# Patient Record
Sex: MALE | Race: WHITE | Employment: FULL TIME
[De-identification: names, ages, dates, MRNs, and addresses within clinical notes are randomized per-mention and may not be internally consistent; named-entity substitution may affect disease eponyms.]

---

## 2017-06-26 DIAGNOSIS — Q20.0 TRUNCUS ARTERIOSUS: Primary | ICD-10-CM

## 2017-06-28 ENCOUNTER — RECORDS - HEALTHEAST (OUTPATIENT)
Dept: ADMINISTRATIVE | Facility: OTHER | Age: 22
End: 2017-06-28

## 2017-06-28 ENCOUNTER — OFFICE VISIT (OUTPATIENT)
Dept: PEDIATRIC CARDIOLOGY | Facility: CLINIC | Age: 22
End: 2017-06-28

## 2017-06-28 VITALS
SYSTOLIC BLOOD PRESSURE: 115 MMHG | HEIGHT: 71 IN | HEART RATE: 85 BPM | DIASTOLIC BLOOD PRESSURE: 70 MMHG | BODY MASS INDEX: 24.78 KG/M2 | WEIGHT: 177.03 LBS

## 2017-06-28 DIAGNOSIS — Q20.0 TRUNCUS ARTERIOSUS: Primary | ICD-10-CM

## 2017-06-28 ASSESSMENT — PAIN SCALES - GENERAL: PAINLEVEL: NO PAIN (0)

## 2017-06-28 NOTE — MR AVS SNAPSHOT
After Visit Summary   2017    Spike Silva    MRN: [id number]           Patient Information     Date Of Birth          1995        Visit Information        Provider Department      2017 3:30 PM Matthew Galvez MD [facility]        Today's Diagnoses     Truncus arteriosus    -  1      Care Instructions    [facility]  Pediatric Specialty Clinic [place]      Pediatric Call Center [phone], option 1  Maggie Montiel RN Care Coordinator:  [phone]    After Hours Emergency:  [phone].  Ask for the on-call doctor for the specialty you are calling for be paged.    Prescription Renewals:  Your pharmacy must fax requests to [phone].  Please allow 2-3 days for prescriptions to be authorized.    If your physician has ordered an x-ray or MRI, you may schedule this test by calling [facility] in [place] at [phone].            Follow-ups after your visit        Who to contact     Please call your clinic at [phone] to:    Ask questions about your health    Make or cancel appointments    Discuss your medicines    Learn about your test results    Speak to your doctor   If you have compliments or concerns about an experience at your clinic, or if you wish to file a complaint, please contact [facility] Patient Relations at [phone] or email us at [email or web address]         Additional Information About Your Visit        MyChart Information     [employer] is an electronic gateway that provides easy, online access to your medical records. With [employer], you can request a clinic appointment, read your test results, renew a prescription or communicate with your care team.     To sign up for [employer] visit the website at [email or web address]   You will be asked to enter the access code listed below, as well as some personal information. Please  "follow the directions to create your username and password.     Your access code is: [email or web address]  Expires: 2017  2:36 PM     Your access code will  in 90 days. If you need help or a new code, please contact your [facility] or call [phone] for assistance.        Care EveryWhere ID     This is your Care EveryWhere ID. This could be used by other organizations to access your [place] medical records  [id number]        Your Vitals Were     Pulse Height BMI (Body Mass Index)             85 5' 10.87\" (180 cm) 24.78 kg/m2          Blood Pressure from Last 3 Encounters:   17 115/70   16 121/76   06/24/15 119/76    Weight from Last 3 Encounters:   17 177 lb 0.5 oz (80.3 kg) (78 %)*   16 162 lb 0.6 oz (73.5 kg) (66 %)*   06/24/15 152 lb 1.9 oz (69 kg) (58 %)*     * Growth percentiles are based on CDC 2-20 Years data.              We Performed the Following     EKG 12-lead complete w/read - Same Day        Primary Care Provider Office Phone # Fax #    Attila Lombardo [phone]       [facility] [address] [address]        Equal Access to Services     DONAVAN SHAIKH : Hadii maeto Cruz, waaxda karrie, qaybta kaalisabel encarnacion . So [facility] [phone].    ATENCIÓN: Si habla español, tiene a stevens disposición servicios gratuitos de asistencia lingüística. Llame al [phone].    We comply with applicable federal civil rights laws and [place] laws. We do not discriminate on the basis of race, color, national origin, age, disability sex, sexual orientation or gender identity.            Thank you!     Thank you for choosing [facility]  for your care. Our goal is always to provide you with excellent care. Hearing back from our patients is one way we can continue to improve our services. Please take a few minutes to " complete the written survey that you may receive in the mail after your visit with us. Thank you!             Your Updated Medication List - Protect others around you: Learn how to safely use, store and throw away your medicines at www.disposemymeds.org.          This list is accurate as of: 6/28/17  3:47 PM.  Always use your most recent med list.                   Brand Name Dispense Instructions for use Diagnosis    cyproheptadine 4 MG tablet    PERIACTIN     Take 6 mg by mouth 3 times daily as needed (for migraine H/A's) 1.5 tabs twice daily        divalproex 500 MG 24 hr tablet    DEPAKOTE ER     Take 1,000 mg by mouth daily        MIDRIN PO      Take 1 tablet by mouth at onset of headache May take 2 tabs PRN

## 2017-06-28 NOTE — LETTER
"  6/28/2017      RE: Spike Silva  [address] [place] [address]       Pediatric Cardiology Visit    Patient:  Spike Silva MRN:  [id number]   YOB: 1995 Age:  21 year old   Date of Visit:  6/28/2017 PCP:  Attila Lombardo MD     Dear Dr. Lombardo:    I had the pleasure of seeing Spike Silva at the [facility] in [place] on 6/28/2017 in ongoing consultation for truncus arteriosus. As you know, he is a 21 year old male with truncus arteriosus, s/p initial PA banding, subsequent complete repair with RV-PA conduit and VSD repair, now s/p PVR with a 21mm porcine Biocor valve in 2008 with Dr. Sexton. I last saw him in 6/2016, and int eh interval since then he has been generally healthy.Active in baseball and trap shooting. No complaints of/perceived chest pain, dyspnea, palpitation, syncope/pre-syncope, easy fatigability. Easily keeps up with peers.    Past medical history:   Past Medical History:   Diagnosis Date     Truncus arteriosus 7/25/2012    As above.  I reviewed Spike Silva's medical records.    He has a current medication list which includes the following prescription(s): divalproex, cyproheptadine, and isometheptene-dichloral-apap. He is allergic to amoxicillin.    Family and Social History: [employer], no tobacco.    The Review of Systems is negative other than noted in the HPI    Physical Examination:  /70 (BP Location: Right arm, Patient Position: Chair, Cuff Size: Adult Large)  Pulse 85  Ht 1.8 m (5' 10.87\")  Wt 80.3 kg (177 lb 0.5 oz)  BMI 24.78 kg/m2  GENERAL: Pleasant and conversant, non-distressed  SKIN: Clear, no rash or abnormal pigmentation  HEAD: NC/AT, nondysmorphic  NECK: Supple without lymphadenopathy or thyromegaly  LUNGS: CTAB, normal symmetric air entry, normal WOB, no rales/rhonchi/wheezes  HEART: Quiet precordium, RRR, normal S1/S2, 4/6 JEFFREY along LUSB with palpable " thrill (louder c/w prior exam), nonradiating, quiet in diastole, S3 present, no r/g  ABDOMEN: Soft, NT/ND, normoactive BS, no HSM  EXTREMITIES: W/WP, no c/c/e, pulses 2+ throughout without radio-femoral delay    I reviewed and interpreted Spike's ECG from today, which showed normal sinus rhythm, normal axes and RBBB, no preexcitation, normal ST-T waves, and normal voltages.   I reviewed his echo from today, which showed normal RV size and systolic function; moderate/severe conduit stenosis to peak 59mmHg, minimal conduit insufficiency. No truncal stenosis or insufficiency. Normal biventricular systolic function, no effusion. Trivial TR, estimated RVSP 86mmHg suggesting additional obstruction apart from the conduit stenosis; branch PAs not well seen.    Assessment and Plan: Spike is a 21 year old male with truncus arteriosus s/p repair with RV-PA conduit and subsequent conduit replacement with a 21mm biocor valve. He is asymptomatic. I am concerned today about the change in his exam and worsened conduit stenosis and elevated RVSP. His MRI from 2015 was concerning for progressive conduit stenosis, but he remains without symptoms and the changes on echocardiogram this year are pronounced. I discussed findings today with Spike, mom and girlfriend. I will arrange for a cardiac CT to evaluate his distal conduit and branch PAs, as well as coronary anatomy for the possibility of transcatheter intervention including possible conduit stent and pulmonary valve implantation. In addition, I will have him see my colleague DR. Matt after the CT to discuss these options. He will follow-up after a presumptive cardiac catheterization with an echocardiogram. He has no activity restrictions. No antibiotic prophylaxis required for invasive procedures.    Thank you for the opportunity to follow Spike with you. Please don't hesitate to contact me with questions or concerns.    Matthew Galvez MD  Pediatric Cardiology  [facility]  [facility] [place] [address], 5th floor, [facility] [address]  Phone [phone]  Fax [phone]

## 2017-06-28 NOTE — NURSING NOTE
"Chief Complaint   Patient presents with     Heart Problem     Follow-up on Truncus.       Initial /70 (BP Location: Right arm, Patient Position: Chair, Cuff Size: Adult Large)  Pulse 85  Ht 5' 10.87\" (180 cm)  Wt 177 lb 0.5 oz (80.3 kg)  BMI 24.78 kg/m2 Estimated body mass index is 24.78 kg/(m^2) as calculated from the following:    Height as of this encounter: 5' 10.87\" (180 cm).    Weight as of this encounter: 177 lb 0.5 oz (80.3 kg).  Medication Reconciliation: complete  "

## 2017-06-28 NOTE — PATIENT INSTRUCTIONS
[facility]  Pediatric Specialty Clinic [place]      Pediatric Call Center [phone], option 1  Maggie Montiel RN Care Coordinator:  [phone]    After Hours Emergency:  [phone].  Ask for the on-call doctor for the specialty you are calling for be paged.    Prescription Renewals:  Your pharmacy must fax requests to [phone].  Please allow 2-3 days for prescriptions to be authorized.    If your physician has ordered an x-ray or MRI, you may schedule this test by calling [facility] in [place] at [phone].

## 2017-06-30 LAB — INTERPRETATION ECG - MUSE: NORMAL

## 2017-07-18 ENCOUNTER — HOSPITAL ENCOUNTER (OUTPATIENT)
Dept: CT IMAGING | Facility: CLINIC | Age: 22
Discharge: HOME | End: 2017-07-18
Attending: PEDIATRICS | Admitting: PEDIATRICS
Payer: COMMERCIAL

## 2017-07-18 VITALS — HEART RATE: 82 BPM | DIASTOLIC BLOOD PRESSURE: 77 MMHG | SYSTOLIC BLOOD PRESSURE: 126 MMHG

## 2017-07-18 DIAGNOSIS — Q20.0 TRUNCUS ARTERIOSUS: ICD-10-CM

## 2017-07-18 PROCEDURE — 25000132 ZZH RX MED GY IP 250 OP 250 PS 637: Performed by: INTERNAL MEDICINE

## 2017-07-18 PROCEDURE — 25000128 H RX IP 250 OP 636: Performed by: INTERNAL MEDICINE

## 2017-07-18 PROCEDURE — 75573 CT HRT C+ STRUX CGEN HRT DS: CPT | Mod: 26 | Performed by: INTERNAL MEDICINE

## 2017-07-18 PROCEDURE — 75573 CT HRT C+ STRUX CGEN HRT DS: CPT

## 2017-07-18 RX ORDER — IOPAMIDOL 755 MG/ML
140 INJECTION, SOLUTION INTRAVASCULAR ONCE
Status: COMPLETED | OUTPATIENT
Start: 2017-07-18 | End: 2017-07-18

## 2017-07-18 RX ORDER — METOPROLOL TARTRATE 25 MG/1
25 TABLET, FILM COATED ORAL ONCE
Status: COMPLETED | OUTPATIENT
Start: 2017-07-18 | End: 2017-07-18

## 2017-07-18 RX ADMIN — IOPAMIDOL 140 ML: 755 INJECTION, SOLUTION INTRAVENOUS at 13:40

## 2017-07-18 RX ADMIN — METOPROLOL TARTRATE 25 MG: 25 TABLET ORAL at 13:01

## 2017-07-19 ENCOUNTER — RECORDS - HEALTHEAST (OUTPATIENT)
Dept: ADMINISTRATIVE | Facility: OTHER | Age: 22
End: 2017-07-19

## 2017-07-19 ENCOUNTER — OFFICE VISIT (OUTPATIENT)
Dept: PEDIATRIC CARDIOLOGY | Facility: CLINIC | Age: 22
End: 2017-07-19
Attending: PEDIATRICS
Payer: COMMERCIAL

## 2017-07-19 VITALS
RESPIRATION RATE: 16 BRPM | HEART RATE: 86 BPM | WEIGHT: 178.57 LBS | DIASTOLIC BLOOD PRESSURE: 64 MMHG | OXYGEN SATURATION: 100 % | SYSTOLIC BLOOD PRESSURE: 118 MMHG | BODY MASS INDEX: 25 KG/M2 | HEIGHT: 71 IN

## 2017-07-19 DIAGNOSIS — Q20.0 TRUNCUS ARTERIOSUS: Primary | ICD-10-CM

## 2017-07-19 DIAGNOSIS — T82.598A RIGHT VENTRICLE-TO-PULMONARY ARTERY (RV-PA) CONDUIT OBSTRUCTION (H): ICD-10-CM

## 2017-07-19 DIAGNOSIS — Z87.74 S/P SURGERY FOR COMPLEX CONGENITAL HEART DISEASE: ICD-10-CM

## 2017-07-19 PROCEDURE — 99213 OFFICE O/P EST LOW 20 MIN: CPT | Mod: ZF

## 2017-07-19 ASSESSMENT — PAIN SCALES - GENERAL: PAINLEVEL: NO PAIN (0)

## 2017-07-19 NOTE — PROGRESS NOTES
"Pediatric Cardiology Visit    Patient:  Spike Silva MRN:  [id number]   YOB: 1995 Age:  21 year old   Date of Visit:  6/28/2017 PCP:  Attila Lombardo MD     Dear Dr. Lombardo:    I had the pleasure of seeing Spike Silva at the [facility] in [place] on 6/28/2017 in ongoing consultation for truncus arteriosus. As you know, he is a 21 year old male with truncus arteriosus, s/p initial PA banding, subsequent complete repair with RV-PA conduit and VSD repair, now s/p PVR with a 21mm porcine Biocor valve in 2008 with Dr. Sexton. I last saw him in 6/2016, and int eh interval since then he has been generally healthy.Active in baseball and trap shooting. No complaints of/perceived chest pain, dyspnea, palpitation, syncope/pre-syncope, easy fatigability. Easily keeps up with peers.    Past medical history:   Past Medical History:   Diagnosis Date     Truncus arteriosus 7/25/2012    As above.  I reviewed Spike Silva's medical records.    He has a current medication list which includes the following prescription(s): divalproex, cyproheptadine, and isometheptene-dichloral-apap. He is allergic to amoxicillin.    Family and Social History: [employer], no tobacco.    The Review of Systems is negative other than noted in the HPI    Physical Examination:  /70 (BP Location: Right arm, Patient Position: Chair, Cuff Size: Adult Large)  Pulse 85  Ht 1.8 m (5' 10.87\")  Wt 80.3 kg (177 lb 0.5 oz)  BMI 24.78 kg/m2  GENERAL: Pleasant and conversant, non-distressed  SKIN: Clear, no rash or abnormal pigmentation  HEAD: NC/AT, nondysmorphic  NECK: Supple without lymphadenopathy or thyromegaly  LUNGS: CTAB, normal symmetric air entry, normal WOB, no rales/rhonchi/wheezes  HEART: Quiet precordium, RRR, normal S1/S2, 4/6 JEFFREY along LUSB with palpable thrill (louder c/w prior exam), nonradiating, quiet in diastole, S3 present, no r/g  ABDOMEN: " Soft, NT/ND, normoactive BS, no HSM  EXTREMITIES: W/WP, no c/c/e, pulses 2+ throughout without radio-femoral delay    I reviewed and interpreted Spike's ECG from today, which showed normal sinus rhythm, normal axes and RBBB, no preexcitation, normal ST-T waves, and normal voltages.   I reviewed his echo from today, which showed normal RV size and systolic function; moderate/severe conduit stenosis to peak 59mmHg, minimal conduit insufficiency. No truncal stenosis or insufficiency. Normal biventricular systolic function, no effusion. Trivial TR, estimated RVSP 86mmHg suggesting additional obstruction apart from the conduit stenosis; branch PAs not well seen.    Assessment and Plan: Spike is a 21 year old male with truncus arteriosus s/p repair with RV-PA conduit and subsequent conduit replacement with a 21mm biocor valve. He is asymptomatic. I am concerned today about the change in his exam and worsened conduit stenosis and elevated RVSP. His MRI from 2015 was concerning for progressive conduit stenosis, but he remains without symptoms and the changes on echocardiogram this year are pronounced. I discussed findings today with Spike, mom and girlfriend. I will arrange for a cardiac CT to evaluate his distal conduit and branch PAs, as well as coronary anatomy for the possibility of transcatheter intervention including possible conduit stent and pulmonary valve implantation. In addition, I will have him see my colleague DR. Matt after the CT to discuss these options. He will follow-up after a presumptive cardiac catheterization with an echocardiogram. He has no activity restrictions. No antibiotic prophylaxis required for invasive procedures.    Thank you for the opportunity to follow Spike with you. Please don't hesitate to contact me with questions or concerns.    Matthew Galvez MD  Pediatric Cardiology  [facility]'[address], 5th floor,  [facility] [address]  Phone [phone]  Fax [phone]

## 2017-07-19 NOTE — NURSING NOTE
"Chief Complaint   Patient presents with     Heart Problem     Truncus arterious consult.       Initial /72  Pulse 84  Resp 16  Ht 5' 11.26\" (181 cm)  Wt 178 lb 9.2 oz (81 kg)  SpO2 100%  BMI 24.72 kg/m2 Estimated body mass index is 24.72 kg/(m^2) as calculated from the following:    Height as of this encounter: 5' 11.26\" (181 cm).    Weight as of this encounter: 178 lb 9.2 oz (81 kg).  Medication Reconciliation: complete       Ligia Lala M.A.    "

## 2017-07-19 NOTE — PROGRESS NOTES
Pediatric Cardiology Clinic Note    Patient:  Spike Silva MRN:  [id number]   YOB: 1995 Age:  21 year old   Date of Visit:  2017 PCP:  Attila Lombardo MD     Dear Attila Crawley MD:    I had the pleasure of seeing your patient Spike Silva at the [facility] for a consultation on 2017 for a consultation regarding cardiac catheterization and interventions on his stenotic conduit.     History of Present Illness:     Spike Silva is a 21 year old with     1. Truncus arteriosus  2. Status post initial pulmonary artery banding in the  period  3. Status post total repair with homograft and VSD patch closure in infancy by Dr. Deras to [facility]  4. Status post RV to PA conduit revision in  using the 21 mm Biocor [place] [place] bioprosthetic valve along with that Dacron patch augmentation of the homograft at [facility]  5. Recurrent conduit stenosis, severe  6. Severe RV hypertension.    Spike is followed by my colleague Dr. Galvez. He has been referred to me for consultation for potential transcatheter intervention on his stenotic conduit.He recently graduated from high school, and is planning to attend [employer] in the fall to pursue a degree in criminal justice and eventually do law enforcement work. He remains active in recreational sports including baseball, and trap shooting. No complaints of/perceived chest pain, dyspnea, palpitation, syncope/pre-syncope, easy fatigability. He does mention that he tires relatively easily compared to his peers.     Past Medical History:     PMH/Birth Hx:  The past medical history was reviewed with the patient and family today and updated    Past surgical Hx: As above    No recent ER visits or hospitalizations. No history of asthma.   Immunizations UTD per parents.  "  He has a current medication list which includes the following prescription(s): divalproex, cyproheptadine, and isometheptene-dichloral-apap. Heis allergic to amoxicillin.      Family and Social History:     The family history was reviewed and updated today. No significant changes were noted.   Mom/Parents report that there is no family history of congenital heart disease, early/unexplained sudden deaths, persons needing pacemakers/defibrillators at a young age.    Mom/Parents report that there is no family history of WPW syndrome, Brugada syndrome, or long QT syndrome.         Review of Systems: A comprehensive review of systems was performed and is negative, except as noted in the HPI and PMH    Physical exam:  His height is 5' 11.26\" (181 cm) and weight is 178 lb 9.2 oz (81 kg). His blood pressure is 114/72 and his pulse is 84. His respiration is 16 and oxygen saturation is 100%.   His body mass index is 24.72 kg/(m^2).  His body surface area is 2.02 meters squared.  There is no central or peripheral cyanosis. Pupils are reactive and sclera are not jaundiced. There is no conjunctival injection or discharge. EOMI. Mucous membranes are moist and pink.   Lungs are clear to ausculation bilaterally with no wheezes, rales or rhonchi. There is no increased work of breathing, retractions or nasal flaring. Precordium is quiet with a normally placed apical impulse. On auscultation, heart sounds are regular with normal S1 and physiologically split S2. There is a thrill on palpation. There is a grade 4/6 high frequency ejection systolic murmur in the left upper sternal border radiating to the axillary. There is no diastolic component.  Abdomen is soft and non-tender without masses or hepatomegaly. Femoral pulses are normal with no brachial femoral delay.Skin is without rashes, lesions, or significant bruising. Extremities are warm and well-perfused with no cyanosis, clubbing or edema. Peripheral pulses are normal and there " is < 2 sec capillary refill. Patient is alert and oriented and moves all extremities equally with normal tone.       Repeat Blood Pressure   BP Pulse Site Cuff Size Time Date   118/64 86 Right Leg Thigh 11:13 AM 2017     No orthostatic vitals data filed.  No peak flow data filed.  No pain information filed.  72 %ile based on CDC 2-20 Years stature-for-age data using vitals from 2017.  79 %ile based on CDC 2-20 Years weight-for-age data using vitals from 2017.  70 %ile based on CDC 2-20 Years BMI-for-age data using vitals from 2017.  No head circumference on file for this encounter.  Blood pressure percentiles are 18 % systolic and 25 % diastolic based on NHBPEP's 4th Report. Blood pressure percentile targets: 90: 137/95, 95: 141/99, 99 + 5 mmH/112.           Investigations and lab work:     12 Lead EKG performed recently was reviewed, it  shows normal sinus rhythm with right bundle branch block.  An echocardiogram performed recently was reviewed by me. It showed RV hypertension with estimated RV pressures of 87+ a WAVE, severe stenosis of the conduit with an immobile pulmonary valves. Post Op Truncus Arteriosis. Technically difficult study due to poor acoustic  windows. Normal right ventricular size. Low normal right ventricular systolic  function. Normal left ventricular systolic function. The peak gradient in the  pulmonary artery homograft is 59 mmHg. Estimated right ventricular systolic  pressure is 86.0 mmHg plus right atrial pressure.    A cardiac MRI performed in  was reviewed today by me along with the parents and the patient. It showed  IMPRESSION:   1. Truncus arteriosus status post surgical repair.  2. Narrowing of the RV to pulmonary artery conduit (14mm) with respect  to the left pulmonary artery (21mm).  3. Truncal root measures 5.2 cm at the sinuses of Valsalva.  4. Right atrial and ventricular enlargement.  5. Borderline left ventricular enlargement.    A CT angiogram of  the heart which was performed yesterday was reviewed by me and I showed it to the parents and the patient. It showed    1. Truncus arteriosus status post repair.   2. There is a PA conduit that is severely calcified. It measures 18 mm  x 18 mm at its origin/RV insertion, 19 mm x 16 mm in its mid segment,  17 mm x 8 mm at its narrowest segment just prior to the PA  bifurcation.  3. Single coronary artery with a posterior takeoff, that splits into  the LAD and a dominant circumflex artery that gives off the PDA. Given  the posterior takeoff, the left main or LAD are not in close proximity  to the PA conduit.  4. Moderate to severe enlargement of the aortic/truncus root,  measuring 5 cm x 4.9 cm at the sinuses of Valsalva.  5. Please see radiology report for incidental noncardiac findings.         Assessment and Plan:     In summary, Spike is a 21 year old with     1. Truncus arteriosus  2. Status post initial pulmonary artery banding in the  period  3. Status post total repair with homograft and VSD patch closure in infancy by Dr. Deras to [facility]  4. Status post RV to PA conduit revision in  using the 21 mm Biocor [place] [place] bioprosthetic valve along with that Dacron patch augmentation of the homograft at [facility]  5. Recurrent conduit stenosis, severe  6. Severe RV hypertension secondary to a combination of immobile, stenotic pulmonary valve, calcified conduit with distal conduit stenosis.      I spent a lot of time talking to the patient and his parents today. We reviewed all his imaging in the past. I think Spike Silva will benefit from cardiac catheterisation and transcatheter intervention including balloon dilation and stent placement in the distal stenotic conduit followed by transcatheter pulmonary valve implantation inside his existing bioprosthetic valve. I discussed with the parents in detail about the options including cardiac catheterisation and surgery. I  explained to them the details of cardiac catheterization including the risks and benefits of the procedure.  Parents and Spike Silva voiced understanding the risks of the procedure and would like to go ahead with cardiac catheterisation which will be scheduled in the next 4-8 weeks.  In view of risks of conduit injury, arrhythmias and the need for large sheath in the femoral vein, we would like to observe him overnight in the cardiac intensive care unit.   I asked to see him back in 4-8 weeks for the cardiac catheterisation procedure.       Thank you for the opportunity to participate in the care of Spike Silva . Please do not hesitate to call with questions or concerns.    Sincerely,      Hunter Matt MD, [facility]   of [facility].  Pediatric interventional cardiologist.   [facility], [facility].   Email: [email or web address]      I, Hunter Matt, spent a total of 60 minutes face-to-face with the patient, Spike Silva. Over 50% of my time was spent counseling the patient and/or coordinating care regarding the diagnosis and its management.       CC:    1. Attila Lombardo    2.  CC  Patient Care Team:  Attila Lombardo MD as PCP - General (Pediatrics)  Darren Diamond MD as MD (Pediatric Cardiology)  DARREN DIAMOND

## 2017-07-19 NOTE — PROVIDER NOTIFICATION
"   07/19/17 1239   Child Life   Location [facility]  (Cardiology clinic)   Intervention Initial Assessment;Preparation;Family Support   Preparation Comment Provided photo and verbal preparation for patient's heart cath. Patient states that he is a little nervous and this is a lot of information. CFLS encouraged pt to write down any questions and to share questions and concerns with medical team on the day of his procedure. Patient stated that he odette well with injections, but has a harder time with IV's. Pt is familiar with Jtip and does feel it is helpful. Also encouraged pt to bring favorites from home for recovery time following cath.    Family Support Comment Mom and Dad both present and supportive. Both parents participated in preparation and asked appropriate questions. Dad reports that pt is very \"Crabby\" when he wakes up from sedation. Dad tends to use humor to try to lighten the mood.   16 Years and above effective coping strategies;effective social interaction skills;enhanced independence;practices good health habits;secure in body image/gender role;views problems comprehensively   Growth and Development Comment Appropriate   Anxiety Appropriate   Able to Shift Focus From Anxiety Easy   Special Interests Country music   Outcomes/Follow Up Continue to Follow/Support     "

## 2017-07-19 NOTE — LETTER
2017      RE: Spike Silva  [address] [place] [address]                                                          Pediatric Cardiology Clinic Note    Patient:  Spike Silva MRN:  [id number]   YOB: 1995 Age:  21 year old   Date of Visit:  2017 PCP:  Attila Lombardo MD     Dear Attila Crawley MD:    I had the pleasure of seeing your patient Spike Silva at the [facility] for a consultation on 2017 for a consultation regarding cardiac catheterization and interventions on his stenotic conduit.     History of Present Illness:     Spike Silva is a 21 year old with     1. Truncus arteriosus  2. Status post initial pulmonary artery banding in the  period  3. Status post total repair with homograft and VSD patch closure in infancy by Dr. Deras to [facility]  4. Status post RV to PA conduit revision in  using the 21 mm Biocor [place]. Raymond bioprosthetic valve along with that Dacron patch augmentation of the homograft at [facility]  5. Recurrent conduit stenosis, severe  6. Severe RV hypertension.    Spike is followed by my colleague Dr. Galvez. He has been referred to me for consultation for potential transcatheter intervention on his stenotic conduit.He recently graduated from high school, and is planning to attend [employer] in the fall to pursue a degree in criminal justice and eventually do law enforcement work. He remains active in recreational sports including baseball, and trap shooting. No complaints of/perceived chest pain, dyspnea, palpitation, syncope/pre-syncope, easy fatigability. He does mention that he tires relatively easily compared to his peers.     Past Medical History:     PMH/Birth Hx:  The past medical history was reviewed with the patient and family today and updated    Past surgical Hx: As above    No  "recent ER visits or hospitalizations. No history of asthma.   Immunizations UTD per parents.   He has a current medication list which includes the following prescription(s): divalproex, cyproheptadine, and isometheptene-dichloral-apap. Heis allergic to amoxicillin.      Family and Social History:     The family history was reviewed and updated today. No significant changes were noted.   Mom/Parents report that there is no family history of congenital heart disease, early/unexplained sudden deaths, persons needing pacemakers/defibrillators at a young age.    Mom/Parents report that there is no family history of WPW syndrome, Brugada syndrome, or long QT syndrome.         Review of Systems: A comprehensive review of systems was performed and is negative, except as noted in the HPI and PMH    Physical exam:  His height is 5' 11.26\" (181 cm) and weight is 178 lb 9.2 oz (81 kg). His blood pressure is 114/72 and his pulse is 84. His respiration is 16 and oxygen saturation is 100%.   His body mass index is 24.72 kg/(m^2).  His body surface area is 2.02 meters squared.  There is no central or peripheral cyanosis. Pupils are reactive and sclera are not jaundiced. There is no conjunctival injection or discharge. EOMI. Mucous membranes are moist and pink.   Lungs are clear to ausculation bilaterally with no wheezes, rales or rhonchi. There is no increased work of breathing, retractions or nasal flaring. Precordium is quiet with a normally placed apical impulse. On auscultation, heart sounds are regular with normal S1 and physiologically split S2. There is a thrill on palpation. There is a grade 4/6 high frequency ejection systolic murmur in the left upper sternal border radiating to the axillary. There is no diastolic component.  Abdomen is soft and non-tender without masses or hepatomegaly. Femoral pulses are normal with no brachial femoral delay.Skin is without rashes, lesions, or significant bruising. Extremities are warm " and well-perfused with no cyanosis, clubbing or edema. Peripheral pulses are normal and there is < 2 sec capillary refill. Patient is alert and oriented and moves all extremities equally with normal tone.       Repeat Blood Pressure   BP Pulse Site Cuff Size Time Date   118/64 86 Right Leg Thigh 11:13 AM 2017     No orthostatic vitals data filed.  No peak flow data filed.  No pain information filed.  72 %ile based on CDC 2-20 Years stature-for-age data using vitals from 2017.  79 %ile based on CDC 2-20 Years weight-for-age data using vitals from 2017.  70 %ile based on CDC 2-20 Years BMI-for-age data using vitals from 2017.  No head circumference on file for this encounter.  Blood pressure percentiles are 18 % systolic and 25 % diastolic based on NHBPEP's 4th Report. Blood pressure percentile targets: 90: 137/95, 95: 141/99, 99 + 5 mmH/112.           Investigations and lab work:     12 Lead EKG performed recently was reviewed, it  shows normal sinus rhythm with right bundle branch block.  An echocardiogram performed recently was reviewed by me. It showed RV hypertension with estimated RV pressures of 87+ a WAVE, severe stenosis of the conduit with an immobile pulmonary valves. Post Op Truncus Arteriosis. Technically difficult study due to poor acoustic  windows. Normal right ventricular size. Low normal right ventricular systolic  function. Normal left ventricular systolic function. The peak gradient in the  pulmonary artery homograft is 59 mmHg. Estimated right ventricular systolic  pressure is 86.0 mmHg plus right atrial pressure.    A cardiac MRI performed in  was reviewed today by me along with the parents and the patient. It showed  IMPRESSION:   1. Truncus arteriosus status post surgical repair.  2. Narrowing of the RV to pulmonary artery conduit (14mm) with respect  to the left pulmonary artery (21mm).  3. Truncal root measures 5.2 cm at the sinuses of Valsalva.  4. Right atrial  and ventricular enlargement.  5. Borderline left ventricular enlargement.    A CT angiogram of the heart which was performed yesterday was reviewed by me and I showed it to the parents and the patient. It showed    1. Truncus arteriosus status post repair.   2. There is a PA conduit that is severely calcified. It measures 18 mm  x 18 mm at its origin/RV insertion, 19 mm x 16 mm in its mid segment,  17 mm x 8 mm at its narrowest segment just prior to the PA  bifurcation.  3. Single coronary artery with a posterior takeoff, that splits into  the LAD and a dominant circumflex artery that gives off the PDA. Given  the posterior takeoff, the left main or LAD are not in close proximity  to the PA conduit.  4. Moderate to severe enlargement of the aortic/truncus root,  measuring 5 cm x 4.9 cm at the sinuses of Valsalva.  5. Please see radiology report for incidental noncardiac findings.         Assessment and Plan:     In summary, Spike is a 21 year old with     1. Truncus arteriosus  2. Status post initial pulmonary artery banding in the  period  3. Status post total repair with homograft and VSD patch closure in infancy by Dr. Deras to [facility]  4. Status post RV to PA conduit revision in  using the 21 mm Biocor St. Raymond bioprosthetic valve along with that Dacron patch augmentation of the homograft at [facility]  5. Recurrent conduit stenosis, severe  6. Severe RV hypertension secondary to a combination of immobile, stenotic pulmonary valve, calcified conduit with distal conduit stenosis.      I spent a lot of time talking to the patient and his parents today. We reviewed all his imaging in the past. I think Spike Silva will benefit from cardiac catheterisation and transcatheter intervention including balloon dilation and stent placement in the distal stenotic conduit followed by transcatheter pulmonary valve implantation inside his existing bioprosthetic valve. I  discussed with the parents in detail about the options including cardiac catheterisation and surgery. I explained to them the details of cardiac catheterization including the risks and benefits of the procedure.  Parents and Spike Silva voiced understanding the risks of the procedure and would like to go ahead with cardiac catheterisation which will be scheduled in the next 4-8 weeks.  In view of risks of conduit injury, arrhythmias and the need for large sheath in the femoral vein, we would like to observe him overnight in the cardiac intensive care unit.   I asked to see him back in 4-8 weeks for the cardiac catheterisation procedure.       Thank you for the opportunity to participate in the care of Spike Silva . Please do not hesitate to call with questions or concerns.    Sincerely,      Hunter Matt MD, [facility]   of Pediatrics.  Pediatric interventional cardiologist.   [facility].   Email: [email or web address]      I, Hunter Matt, spent a total of 60 minutes face-to-face with the patient, Spike Silva. Over 50% of my time was spent counseling the patient and/or coordinating care regarding the diagnosis and its management.       CC  Patient Care Team:  Attila Lombardo MD as PCP - General (Pediatrics)  Matthew Galvez MD as MD (Pediatric Cardiology)

## 2017-07-19 NOTE — MR AVS SNAPSHOT
After Visit Summary   7/19/2017    Spike Silva    MRN: [id number]           Patient Information     Date Of Birth          1995        Visit Information        Provider Department      7/19/2017 11:00 AM Hunter Steward MD Peds Cardiology        Care Instructions      PEDS CARDIOLOGY  [facility]  [address] [id number] [phone]      Cardiology Clinic  [phone]  Cardiology Office  [phone]  RN Care Coordinator, Carolina Leija (Bre)  [phone]  Pediatric Call Center/Scheduling  [phone]    After Hours and Emergency Contact Number  [phone]  * Ask for the pediatric cardiologist on call         Prescription Renewals  The pharmacy must fax requests to [phone]  * Please allow 3-4 days for prescriptions to be authorized               Follow-ups after your visit        Future tests that were ordered for you today     Open Future Orders        Priority Expected Expires Ordered    Radiologist Consult For Cardiology Routine 7/18/2017 7/18/2018 7/18/2017    CT Congenital Heart Disease Angio Routine  7/11/2018 7/10/2017            Who to contact     Please call your clinic at [phone] to:    Ask questions about your health    Make or cancel appointments    Discuss your medicines    Learn about your test results    Speak to your doctor   If you have compliments or concerns about an experience at your clinic, or if you wish to file a complaint, please contact [facility] Patient Relations at [phone] or email us at [email or web address]         Additional Information About Your Visit        MyChart Information     [employer] is an electronic gateway that provides easy, online access to your medical records. With [employer], you can request a clinic appointment, read your test results, renew a prescription or communicate with your care team.     To sign up  "for MyChart visit the website at [email or web address]   You will be asked to enter the access code listed below, as well as some personal information. Please follow the directions to create your username and password.     Your access code is: [email or web address]  Expires: 2017  2:36 PM     Your access code will  in 90 days. If you need help or a new code, please contact your [facility] or call [phone] for assistance.        Care EveryWhere ID     This is your Care EveryWhere ID. This could be used by other organizations to access your [place] medical records  [id number]        Your Vitals Were     Pulse Respirations Height Pulse Oximetry BMI (Body Mass Index)       84 16 1.81 m (5' 11.26\") 100% 24.72 kg/m2        Blood Pressure from Last 3 Encounters:   17 114/72   17 126/77   17 115/70    Weight from Last 3 Encounters:   17 81 kg (178 lb 9.2 oz) (79 %)*   17 80.3 kg (177 lb 0.5 oz) (78 %)*   16 73.5 kg (162 lb 0.6 oz) (66 %)*     * Growth percentiles are based on [facility] 2-20 Years data.              Today, you had the following     No orders found for display       Primary Care Provider Office Phone # Fax #    Attila Lombardo [phone]       [facility] [address] [address]        Equal Access to Services     DONAVAN SHAIKH AH: Hadii aad ku hadasho Soomaali, waaxda luqadaha, qaybta kaalmada adeegyessenia, isabel louie . So [facility] [phone].    ATENCIÓN: Si habla español, tiene a stevens disposición servicios gratuitos de asistencia lingüística. Llame al [phone].    We comply with applicable federal civil rights laws and [place] laws. We do not discriminate on the basis of race, color, national origin, age, disability sex, sexual orientation or gender identity.            Thank you!     Thank you for choosing PEDS CARDIOLOGY  for your care. Our goal is always to " provide you with excellent care. Hearing back from our patients is one way we can continue to improve our services. Please take a few minutes to complete the written survey that you may receive in the mail after your visit with us. Thank you!             Your Updated Medication List - Protect others around you: Learn how to safely use, store and throw away your medicines at www.disposemymeds.org.          This list is accurate as of: 7/19/17 12:12 PM.  Always use your most recent med list.                   Brand Name Dispense Instructions for use Diagnosis    cyproheptadine 4 MG tablet    PERIACTIN     Take 6 mg by mouth 3 times daily as needed (for migraine H/A's) 1.5 tabs twice daily        divalproex 500 MG 24 hr tablet    DEPAKOTE ER     Take 1,000 mg by mouth daily        MIDRIN PO      Take 1 tablet by mouth at onset of headache May take 2 tabs PRN

## 2017-07-19 NOTE — PATIENT INSTRUCTIONS
PEDS CARDIOLOGY  [facility] [address] [place] [address]  [address] [facility] [id number] [phone]      Cardiology Clinic  [phone]  Cardiology Office  [phone]  RN Care Coordinator, Carolina Leija (Bre)  [phone]  Pediatric Call Center/Scheduling  [phone]    After Hours and Emergency Contact Number  [phone]  * Ask for the pediatric cardiologist on call         Prescription Renewals  The pharmacy must fax requests to [phone]  * Please allow 3-4 days for prescriptions to be authorized

## 2017-07-24 ENCOUNTER — TELEPHONE (OUTPATIENT)
Dept: OTHER | Facility: CLINIC | Age: 22
End: 2017-07-24

## 2017-09-01 ENCOUNTER — OFFICE VISIT - HEALTHEAST (OUTPATIENT)
Dept: PEDIATRICS | Facility: CLINIC | Age: 22
End: 2017-09-01

## 2017-09-01 DIAGNOSIS — Z01.818 VISIT FOR PRE-OPERATIVE EXAMINATION: ICD-10-CM

## 2017-09-01 DIAGNOSIS — Z98.890 S/P REPAIR OF TRUNCUS ARTERIOSUS: ICD-10-CM

## 2017-09-01 ASSESSMENT — MIFFLIN-ST. JEOR: SCORE: 1822.38

## 2017-09-07 ENCOUNTER — ANESTHESIA EVENT (OUTPATIENT)
Dept: SURGERY | Facility: CLINIC | Age: 22
End: 2017-09-07
Payer: COMMERCIAL

## 2017-09-12 ENCOUNTER — TELEPHONE (OUTPATIENT)
Dept: PEDIATRIC CARDIOLOGY | Facility: CLINIC | Age: 22
End: 2017-09-12

## 2017-09-12 NOTE — TELEPHONE ENCOUNTER
Contacted patient to discuss heart catheterization scheduled on Friday 9/15/17 at 0800. Left message with family member to discuss items below. Patient called back from cell phone as he is up at [facility] ( I added cell number to chart).        The patient has not been ill; he denies, fever, runny nose, cough, vomiting, diarrhea, or rash.    Discussed:  Arrival time: 6:30 AM  NPO times: 4:30 AM, no solid foods after midnight, clear liquids up until 04:30  History & Physical completed and scanned in.   Medications: No medications need to be held before the procedure. Patient takes migraine medication and instructed him to take it with a small sip of water morning of.     Also discussed that no special soap is needed prior to the procedure and that DEL CASTILLO will be calling the family as well.  All questions were answered. Encouraged him to call us back with any questions or concerns prior to the procedure.

## 2017-09-13 RX ORDER — ALBUTEROL SULFATE 0.83 MG/ML
1 SOLUTION RESPIRATORY (INHALATION) EVERY 6 HOURS PRN
COMMUNITY

## 2017-09-15 ENCOUNTER — RECORDS - HEALTHEAST (OUTPATIENT)
Dept: ADMINISTRATIVE | Facility: OTHER | Age: 22
End: 2017-09-15

## 2017-09-15 ENCOUNTER — SURGERY (OUTPATIENT)
Age: 22
End: 2017-09-15

## 2017-09-15 ENCOUNTER — APPOINTMENT (OUTPATIENT)
Dept: CARDIOLOGY | Facility: CLINIC | Age: 22
End: 2017-09-15
Attending: PEDIATRICS
Payer: COMMERCIAL

## 2017-09-15 ENCOUNTER — ANESTHESIA (OUTPATIENT)
Dept: SURGERY | Facility: CLINIC | Age: 22
End: 2017-09-15
Payer: COMMERCIAL

## 2017-09-15 ENCOUNTER — APPOINTMENT (OUTPATIENT)
Dept: GENERAL RADIOLOGY | Facility: CLINIC | Age: 22
End: 2017-09-15
Attending: PEDIATRICS
Payer: COMMERCIAL

## 2017-09-15 ENCOUNTER — HOSPITAL ENCOUNTER (OUTPATIENT)
Facility: CLINIC | Age: 22
Discharge: HOME | End: 2017-09-16
Attending: PEDIATRICS | Admitting: PEDIATRICS
Payer: COMMERCIAL

## 2017-09-15 DIAGNOSIS — Z95.2 HISTORY OF ARTIFICIAL HEART VALVE: Primary | ICD-10-CM

## 2017-09-15 LAB
ABO + RH BLD: NORMAL
ABO + RH BLD: NORMAL
ALBUMIN SERPL-MCNC: 3.3 G/DL (ref 3.4–5)
ALP SERPL-CCNC: 35 U/L (ref 40–150)
ALT SERPL W P-5'-P-CCNC: 29 U/L (ref 0–70)
ANION GAP SERPL CALCULATED.3IONS-SCNC: 8 MMOL/L (ref 3–14)
APTT PPP: 33 SEC (ref 22–37)
AST SERPL W P-5'-P-CCNC: 15 U/L (ref 0–45)
BASE EXCESS BLDA CALC-SCNC: 0.6 MMOL/L
BASE EXCESS BLDA CALC-SCNC: 1.5 MMOL/L
BASE EXCESS BLDA CALC-SCNC: 2 MMOL/L
BASOPHILS # BLD AUTO: 0 10E9/L (ref 0–0.2)
BASOPHILS NFR BLD AUTO: 0.3 %
BILIRUB SERPL-MCNC: 0.6 MG/DL (ref 0.2–1.3)
BLD GP AB SCN SERPL QL: NORMAL
BLD PROD TYP BPU: NORMAL
BLD UNIT ID BPU: 0
BLD UNIT ID BPU: 0
BLOOD BANK CMNT PATIENT-IMP: NORMAL
BLOOD PRODUCT CODE: NORMAL
BLOOD PRODUCT CODE: NORMAL
BPU ID: NORMAL
BPU ID: NORMAL
BUN SERPL-MCNC: 12 MG/DL (ref 7–30)
CA-I BLD-MCNC: 4.5 MG/DL (ref 4.4–5.2)
CA-I BLD-MCNC: 4.5 MG/DL (ref 4.4–5.2)
CA-I BLD-MCNC: 4.6 MG/DL (ref 4.4–5.2)
CALCIUM SERPL-MCNC: 8.1 MG/DL (ref 8.5–10.1)
CHLORIDE SERPL-SCNC: 107 MMOL/L (ref 94–109)
CO2 SERPL-SCNC: 26 MMOL/L (ref 20–32)
CREAT SERPL-MCNC: 0.7 MG/DL (ref 0.66–1.25)
DIFFERENTIAL METHOD BLD: ABNORMAL
EOSINOPHIL # BLD AUTO: 0.1 10E9/L (ref 0–0.7)
EOSINOPHIL NFR BLD AUTO: 1 %
ERYTHROCYTE [DISTWIDTH] IN BLOOD BY AUTOMATED COUNT: 12.4 % (ref 10–15)
FIBRINOGEN PPP-MCNC: 171 MG/DL (ref 200–420)
GFR SERPL CREATININE-BSD FRML MDRD: >90 ML/MIN/1.7M2
GLUCOSE BLD-MCNC: 100 MG/DL (ref 70–99)
GLUCOSE BLD-MCNC: 117 MG/DL (ref 70–99)
GLUCOSE BLD-MCNC: 96 MG/DL (ref 70–99)
GLUCOSE SERPL-MCNC: 99 MG/DL (ref 70–99)
HCO3 BLD-SCNC: 24 MMOL/L (ref 21–28)
HCO3 BLD-SCNC: 25 MMOL/L (ref 21–28)
HCO3 BLD-SCNC: 26 MMOL/L (ref 21–28)
HCT VFR BLD AUTO: 41.7 % (ref 40–53)
HCT VFR BLD AUTO: 41.8 % (ref 40–53)
HGB BLD-MCNC: 14.6 G/DL (ref 13.3–17.7)
HGB BLD-MCNC: 14.7 G/DL (ref 13.3–17.7)
HGB BLD-MCNC: 14.8 G/DL (ref 13.3–17.7)
HGB BLD-MCNC: 14.8 G/DL (ref 13.3–17.7)
HGB BLD-MCNC: 15.3 G/DL (ref 13.3–17.7)
IMM GRANULOCYTES # BLD: 0 10E9/L (ref 0–0.4)
IMM GRANULOCYTES NFR BLD: 0 %
INR PPP: 1.13 (ref 0.86–1.14)
KCT BLD-ACNC: 180 SEC (ref 105–167)
KCT BLD-ACNC: 184 SEC (ref 105–167)
KCT BLD-ACNC: 196 SEC (ref 105–167)
KCT BLD-ACNC: 200 SEC (ref 105–167)
KCT BLD-ACNC: 205 SEC (ref 105–167)
KCT BLD-ACNC: 213 SEC (ref 105–167)
KCT BLD-ACNC: 213 SEC (ref 105–167)
KCT BLD-ACNC: 221 SEC (ref 105–167)
LACTATE BLD-SCNC: 2 MMOL/L (ref 0.7–2)
LACTATE BLD-SCNC: 2 MMOL/L (ref 0.7–2)
LACTATE BLD-SCNC: 2.1 MMOL/L (ref 0.7–2)
LYMPHOCYTES # BLD AUTO: 0.9 10E9/L (ref 0.8–5.3)
LYMPHOCYTES NFR BLD AUTO: 16 %
MCH RBC QN AUTO: 30.7 PG (ref 26.5–33)
MCHC RBC AUTO-ENTMCNC: 35.5 G/DL (ref 31.5–36.5)
MCV RBC AUTO: 87 FL (ref 78–100)
MONOCYTES # BLD AUTO: 0.5 10E9/L (ref 0–1.3)
MONOCYTES NFR BLD AUTO: 7.7 %
MRSA DNA SPEC QL NAA+PROBE: NEGATIVE
NEUTROPHILS # BLD AUTO: 4.4 10E9/L (ref 1.6–8.3)
NEUTROPHILS NFR BLD AUTO: 75 %
NRBC # BLD AUTO: 0 10*3/UL
NRBC BLD AUTO-RTO: 0 /100
NUM BPU REQUESTED: 2
O2/TOTAL GAS SETTING VFR VENT: 21 %
O2/TOTAL GAS SETTING VFR VENT: 21 %
O2/TOTAL GAS SETTING VFR VENT: 26 %
OXYHGB MFR BLD: 97 % (ref 92–100)
OXYHGB MFR BLD: 98 % (ref 92–100)
OXYHGB MFR BLD: 98 % (ref 92–100)
PCO2 BLD: 33 MM HG (ref 35–45)
PCO2 BLD: 35 MM HG (ref 35–45)
PCO2 BLD: 38 MM HG (ref 35–45)
PH BLD: 7.45 PH (ref 7.35–7.45)
PH BLD: 7.45 PH (ref 7.35–7.45)
PH BLD: 7.48 PH (ref 7.35–7.45)
PLATELET # BLD AUTO: 112 10E9/L (ref 150–450)
PO2 BLD: 106 MM HG (ref 80–105)
PO2 BLD: 107 MM HG (ref 80–105)
PO2 BLD: 143 MM HG (ref 80–105)
POTASSIUM BLD-SCNC: 3.8 MMOL/L (ref 3.4–5.3)
POTASSIUM BLD-SCNC: 3.8 MMOL/L (ref 3.4–5.3)
POTASSIUM BLD-SCNC: 4.1 MMOL/L (ref 3.4–5.3)
POTASSIUM SERPL-SCNC: 3.9 MMOL/L (ref 3.4–5.3)
PROT SERPL-MCNC: 6.4 G/DL (ref 6.8–8.8)
RBC # BLD AUTO: 4.82 10E12/L (ref 4.4–5.9)
SODIUM BLD-SCNC: 138 MMOL/L (ref 133–144)
SODIUM BLD-SCNC: 140 MMOL/L (ref 133–144)
SODIUM BLD-SCNC: 140 MMOL/L (ref 133–144)
SODIUM SERPL-SCNC: 141 MMOL/L (ref 133–144)
SPECIMEN EXP DATE BLD: NORMAL
SPECIMEN SOURCE: NORMAL
TRANSFUSION STATUS PATIENT QL: NORMAL
WBC # BLD AUTO: 5.9 10E9/L (ref 4–11)

## 2017-09-15 PROCEDURE — 25000128 H RX IP 250 OP 636: Performed by: NURSE ANESTHETIST, CERTIFIED REGISTERED

## 2017-09-15 PROCEDURE — C1725 CATH, TRANSLUMIN NON-LASER: HCPCS

## 2017-09-15 PROCEDURE — C1769 GUIDE WIRE: HCPCS

## 2017-09-15 PROCEDURE — 85610 PROTHROMBIN TIME: CPT | Performed by: PHYSICIAN ASSISTANT

## 2017-09-15 PROCEDURE — 93531 ZZHC COMB RT & LT HEART CATH FOR CONGENITAL ANOMALIES: CPT

## 2017-09-15 PROCEDURE — B2111ZZ FLUOROSCOPY OF MULTIPLE CORONARY ARTERIES USING LOW OSMOLAR CONTRAST: ICD-10-PCS | Performed by: PEDIATRICS

## 2017-09-15 PROCEDURE — 25000132 ZZH RX MED GY IP 250 OP 250 PS 637: Performed by: PEDIATRICS

## 2017-09-15 PROCEDURE — 86900 BLOOD TYPING SEROLOGIC ABO: CPT | Performed by: PHYSICIAN ASSISTANT

## 2017-09-15 PROCEDURE — P9016 RBC LEUKOCYTES REDUCED: HCPCS | Performed by: PHYSICIAN ASSISTANT

## 2017-09-15 PROCEDURE — 27810330

## 2017-09-15 PROCEDURE — 27210845 ZZH DEVICE INFLATION CR5

## 2017-09-15 PROCEDURE — B31U1ZZ FLUOROSCOPY OF PULMONARY TRUNK USING LOW OSMOLAR CONTRAST: ICD-10-PCS | Performed by: PEDIATRICS

## 2017-09-15 PROCEDURE — 40000014 ZZH STATISTIC ARTERIAL MONITORING DAILY

## 2017-09-15 PROCEDURE — 85347 COAGULATION TIME ACTIVATED: CPT

## 2017-09-15 PROCEDURE — 83605 ASSAY OF LACTIC ACID: CPT

## 2017-09-15 PROCEDURE — 85384 FIBRINOGEN ACTIVITY: CPT | Performed by: PHYSICIAN ASSISTANT

## 2017-09-15 PROCEDURE — 82947 ASSAY GLUCOSE BLOOD QUANT: CPT

## 2017-09-15 PROCEDURE — 27210808 ZZH SHEATH CR7

## 2017-09-15 PROCEDURE — 94681 O2 UPTK CO2 OUTP % O2 XTRC: CPT

## 2017-09-15 PROCEDURE — 37236 OPEN/PERQ PLACE STENT 1ST: CPT | Mod: XS

## 2017-09-15 PROCEDURE — 71010 XR CHEST PORT 1 VW: CPT

## 2017-09-15 PROCEDURE — 40000940 XR CHEST PORT 1 VW

## 2017-09-15 PROCEDURE — 86923 COMPATIBILITY TEST ELECTRIC: CPT | Performed by: PHYSICIAN ASSISTANT

## 2017-09-15 PROCEDURE — 25000125 ZZHC RX 250: Performed by: NURSE ANESTHETIST, CERTIFIED REGISTERED

## 2017-09-15 PROCEDURE — 84132 ASSAY OF SERUM POTASSIUM: CPT

## 2017-09-15 PROCEDURE — 02RH38Z REPLACEMENT OF PULMONARY VALVE WITH ZOOPLASTIC TISSUE, PERCUTANEOUS APPROACH: ICD-10-PCS | Performed by: PEDIATRICS

## 2017-09-15 PROCEDURE — 86850 RBC ANTIBODY SCREEN: CPT | Performed by: PHYSICIAN ASSISTANT

## 2017-09-15 PROCEDURE — 93568 NJX CAR CTH NSLC P-ART ANGRP: CPT | Mod: XU

## 2017-09-15 PROCEDURE — 84295 ASSAY OF SERUM SODIUM: CPT

## 2017-09-15 PROCEDURE — 27210841 ZZH MANIFOLD CR5

## 2017-09-15 PROCEDURE — 85025 COMPLETE CBC W/AUTO DIFF WBC: CPT | Performed by: PHYSICIAN ASSISTANT

## 2017-09-15 PROCEDURE — 20300001 ZZH R&B PICU INTERMEDIATE UMMC

## 2017-09-15 PROCEDURE — 27810329

## 2017-09-15 PROCEDURE — 27210956 ZZH BALLOON TIP PRESSURE CR7

## 2017-09-15 PROCEDURE — C1887 CATHETER, GUIDING: HCPCS

## 2017-09-15 PROCEDURE — 25000128 H RX IP 250 OP 636: Performed by: PEDIATRICS

## 2017-09-15 PROCEDURE — C1877 STENT, NON-COAT/COV W/O DEL: HCPCS

## 2017-09-15 PROCEDURE — 85014 HEMATOCRIT: CPT | Performed by: PEDIATRICS

## 2017-09-15 PROCEDURE — 85730 THROMBOPLASTIN TIME PARTIAL: CPT | Performed by: PHYSICIAN ASSISTANT

## 2017-09-15 PROCEDURE — 40000275 ZZH STATISTIC RCP TIME EA 10 MIN

## 2017-09-15 PROCEDURE — C9399 UNCLASSIFIED DRUGS OR BIOLOG: HCPCS | Performed by: NURSE ANESTHETIST, CERTIFIED REGISTERED

## 2017-09-15 PROCEDURE — 27210741 ZZH BALLOON TIP PRESSURE CR6

## 2017-09-15 PROCEDURE — 27210769 ZZH KIT ACIST INJECTOR CR4

## 2017-09-15 PROCEDURE — 87641 MR-STAPH DNA AMP PROBE: CPT | Performed by: PEDIATRICS

## 2017-09-15 PROCEDURE — 86901 BLOOD TYPING SEROLOGIC RH(D): CPT | Performed by: PHYSICIAN ASSISTANT

## 2017-09-15 PROCEDURE — 87640 STAPH A DNA AMP PROBE: CPT | Performed by: PEDIATRICS

## 2017-09-15 PROCEDURE — 80053 COMPREHEN METABOLIC PANEL: CPT | Performed by: PHYSICIAN ASSISTANT

## 2017-09-15 PROCEDURE — 82330 ASSAY OF CALCIUM: CPT

## 2017-09-15 PROCEDURE — 40000170 ZZH STATISTIC PRE-PROCEDURE ASSESSMENT II: Performed by: PEDIATRICS

## 2017-09-15 PROCEDURE — 82810 BLOOD GASES O2 SAT ONLY: CPT

## 2017-09-15 PROCEDURE — 27211192 ZZ H SHEATH CR14

## 2017-09-15 PROCEDURE — 27210856 ZZH ACCESS HEART CATH CR2

## 2017-09-15 PROCEDURE — 25000125 ZZHC RX 250: Performed by: ANESTHESIOLOGY

## 2017-09-15 PROCEDURE — 85018 HEMOGLOBIN: CPT | Performed by: PEDIATRICS

## 2017-09-15 PROCEDURE — 27210946 ZZH KIT HC TOTES DISP CR8

## 2017-09-15 PROCEDURE — 25000565 ZZH ISOFLURANE, EA 15 MIN: Performed by: PEDIATRICS

## 2017-09-15 PROCEDURE — 86900 BLOOD TYPING SEROLOGIC ABO: CPT | Performed by: PEDIATRICS

## 2017-09-15 PROCEDURE — 37000009 ZZH ANESTHESIA TECHNICAL FEE, EACH ADDTL 15 MIN: Performed by: PEDIATRICS

## 2017-09-15 PROCEDURE — 82803 BLOOD GASES ANY COMBINATION: CPT

## 2017-09-15 PROCEDURE — 25000128 H RX IP 250 OP 636: Performed by: ANESTHESIOLOGY

## 2017-09-15 PROCEDURE — 93563 NJX CGEN CAR CTH SLCTV C ANG: CPT | Mod: XU

## 2017-09-15 PROCEDURE — 33477 IMPLANT TCAT PULM VLV PERQ: CPT

## 2017-09-15 PROCEDURE — 4A023N8 MEASUREMENT OF CARDIAC SAMPLING AND PRESSURE, BILATERAL, PERCUTANEOUS APPROACH: ICD-10-PCS | Performed by: PEDIATRICS

## 2017-09-15 PROCEDURE — 37000008 ZZH ANESTHESIA TECHNICAL FEE, 1ST 30 MIN: Performed by: PEDIATRICS

## 2017-09-15 RX ORDER — CEFAZOLIN SODIUM 1 G/3ML
INJECTION, POWDER, FOR SOLUTION INTRAMUSCULAR; INTRAVENOUS PRN
Status: DISCONTINUED | OUTPATIENT
Start: 2017-09-15 | End: 2017-09-15

## 2017-09-15 RX ORDER — ONDANSETRON 2 MG/ML
INJECTION INTRAMUSCULAR; INTRAVENOUS PRN
Status: DISCONTINUED | OUTPATIENT
Start: 2017-09-15 | End: 2017-09-15

## 2017-09-15 RX ORDER — ASPIRIN 81 MG/1
81 TABLET ORAL DAILY
Status: DISCONTINUED | OUTPATIENT
Start: 2017-09-15 | End: 2017-09-16 | Stop reason: HOSPADM

## 2017-09-15 RX ORDER — LIDOCAINE HYDROCHLORIDE 20 MG/ML
INJECTION, SOLUTION INFILTRATION; PERINEURAL PRN
Status: DISCONTINUED | OUTPATIENT
Start: 2017-09-15 | End: 2017-09-15

## 2017-09-15 RX ORDER — NITROGLYCERIN 5 MG/ML
INJECTION, SOLUTION INTRAVENOUS PRN
Status: DISCONTINUED | OUTPATIENT
Start: 2017-09-15 | End: 2017-09-15 | Stop reason: HOSPADM

## 2017-09-15 RX ORDER — SODIUM CHLORIDE, SODIUM LACTATE, POTASSIUM CHLORIDE, CALCIUM CHLORIDE 600; 310; 30; 20 MG/100ML; MG/100ML; MG/100ML; MG/100ML
INJECTION, SOLUTION INTRAVENOUS CONTINUOUS PRN
Status: DISCONTINUED | OUTPATIENT
Start: 2017-09-15 | End: 2017-09-15

## 2017-09-15 RX ORDER — FENTANYL CITRATE 50 UG/ML
INJECTION, SOLUTION INTRAMUSCULAR; INTRAVENOUS PRN
Status: DISCONTINUED | OUTPATIENT
Start: 2017-09-15 | End: 2017-09-15

## 2017-09-15 RX ORDER — DIVALPROEX SODIUM 500 MG/1
1000 TABLET, EXTENDED RELEASE ORAL DAILY
Status: DISCONTINUED | OUTPATIENT
Start: 2017-09-15 | End: 2017-09-16 | Stop reason: HOSPADM

## 2017-09-15 RX ORDER — CEFAZOLIN SODIUM 1 G/3ML
1 INJECTION, POWDER, FOR SOLUTION INTRAMUSCULAR; INTRAVENOUS EVERY 6 HOURS
Status: DISCONTINUED | OUTPATIENT
Start: 2017-09-15 | End: 2017-09-16

## 2017-09-15 RX ORDER — CEFAZOLIN SODIUM 1 G/3ML
1 INJECTION, POWDER, FOR SOLUTION INTRAMUSCULAR; INTRAVENOUS EVERY 6 HOURS
Status: DISCONTINUED | OUTPATIENT
Start: 2017-09-15 | End: 2017-09-15

## 2017-09-15 RX ORDER — HEPARIN SODIUM 1000 [USP'U]/ML
INJECTION, SOLUTION INTRAVENOUS; SUBCUTANEOUS PRN
Status: DISCONTINUED | OUTPATIENT
Start: 2017-09-15 | End: 2017-09-15

## 2017-09-15 RX ORDER — DEXAMETHASONE SODIUM PHOSPHATE 4 MG/ML
INJECTION, SOLUTION INTRA-ARTICULAR; INTRALESIONAL; INTRAMUSCULAR; INTRAVENOUS; SOFT TISSUE PRN
Status: DISCONTINUED | OUTPATIENT
Start: 2017-09-15 | End: 2017-09-15

## 2017-09-15 RX ORDER — ACETAMINOPHEN 325 MG/1
325 TABLET ORAL EVERY 4 HOURS PRN
Status: DISCONTINUED | OUTPATIENT
Start: 2017-09-15 | End: 2017-09-16 | Stop reason: HOSPADM

## 2017-09-15 RX ORDER — PROPOFOL 10 MG/ML
INJECTION, EMULSION INTRAVENOUS PRN
Status: DISCONTINUED | OUTPATIENT
Start: 2017-09-15 | End: 2017-09-15

## 2017-09-15 RX ORDER — IODIXANOL 320 MG/ML
INJECTION, SOLUTION INTRAVASCULAR PRN
Status: DISCONTINUED | OUTPATIENT
Start: 2017-09-15 | End: 2017-09-15 | Stop reason: HOSPADM

## 2017-09-15 RX ADMIN — LIDOCAINE HYDROCHLORIDE 80 MG: 20 INJECTION, SOLUTION INFILTRATION; PERINEURAL at 08:24

## 2017-09-15 RX ADMIN — SODIUM CHLORIDE, POTASSIUM CHLORIDE, SODIUM LACTATE AND CALCIUM CHLORIDE: 600; 310; 30; 20 INJECTION, SOLUTION INTRAVENOUS at 13:30

## 2017-09-15 RX ADMIN — Medication 10 MG: at 11:49

## 2017-09-15 RX ADMIN — IODIXANOL 287 ML: 320 INJECTION, SOLUTION INTRAVASCULAR at 13:48

## 2017-09-15 RX ADMIN — DEXMEDETOMIDINE HYDROCHLORIDE 20 MCG: 100 INJECTION, SOLUTION INTRAVENOUS at 13:50

## 2017-09-15 RX ADMIN — ACETAMINOPHEN 325 MG: 325 TABLET, FILM COATED ORAL at 20:06

## 2017-09-15 RX ADMIN — DIVALPROEX SODIUM 1000 MG: 500 TABLET, EXTENDED RELEASE ORAL at 20:49

## 2017-09-15 RX ADMIN — Medication 20 MG: at 10:15

## 2017-09-15 RX ADMIN — Medication 10 MG: at 12:46

## 2017-09-15 RX ADMIN — Medication 50 MG: at 08:25

## 2017-09-15 RX ADMIN — HEPARIN SODIUM 3000 UNITS: 1000 INJECTION, SOLUTION INTRAVENOUS; SUBCUTANEOUS at 10:14

## 2017-09-15 RX ADMIN — FENTANYL CITRATE 25 MCG: 50 INJECTION, SOLUTION INTRAMUSCULAR; INTRAVENOUS at 10:21

## 2017-09-15 RX ADMIN — HEPARIN SODIUM 1500 UNITS: 1000 INJECTION, SOLUTION INTRAVENOUS; SUBCUTANEOUS at 12:01

## 2017-09-15 RX ADMIN — CEFAZOLIN 2 G: 1 INJECTION, POWDER, FOR SOLUTION INTRAMUSCULAR; INTRAVENOUS at 09:09

## 2017-09-15 RX ADMIN — CEFAZOLIN 1 G: 1 INJECTION, POWDER, FOR SOLUTION INTRAMUSCULAR; INTRAVENOUS at 11:09

## 2017-09-15 RX ADMIN — MIDAZOLAM HYDROCHLORIDE 2 MG: 1 INJECTION, SOLUTION INTRAMUSCULAR; INTRAVENOUS at 08:12

## 2017-09-15 RX ADMIN — Medication 20 MG: at 12:18

## 2017-09-15 RX ADMIN — FENTANYL CITRATE 25 MCG: 50 INJECTION, SOLUTION INTRAMUSCULAR; INTRAVENOUS at 13:57

## 2017-09-15 RX ADMIN — Medication 10 MG: at 09:27

## 2017-09-15 RX ADMIN — ONDANSETRON 4 MG: 2 INJECTION INTRAMUSCULAR; INTRAVENOUS at 13:38

## 2017-09-15 RX ADMIN — Medication 6 MG: at 20:55

## 2017-09-15 RX ADMIN — Medication 20 MG: at 09:39

## 2017-09-15 RX ADMIN — Medication 20 MG: at 09:05

## 2017-09-15 RX ADMIN — Medication 10 MG: at 11:30

## 2017-09-15 RX ADMIN — HEPARIN SODIUM 8000 UNITS: 1000 INJECTION, SOLUTION INTRAVENOUS; SUBCUTANEOUS at 09:34

## 2017-09-15 RX ADMIN — SODIUM CHLORIDE, POTASSIUM CHLORIDE, SODIUM LACTATE AND CALCIUM CHLORIDE: 600; 310; 30; 20 INJECTION, SOLUTION INTRAVENOUS at 08:12

## 2017-09-15 RX ADMIN — HEPARIN SODIUM 1500 UNITS: 1000 INJECTION, SOLUTION INTRAVENOUS; SUBCUTANEOUS at 12:36

## 2017-09-15 RX ADMIN — HEPARIN SODIUM 2000 UNITS: 1000 INJECTION, SOLUTION INTRAVENOUS; SUBCUTANEOUS at 11:33

## 2017-09-15 RX ADMIN — FENTANYL CITRATE 100 MCG: 50 INJECTION, SOLUTION INTRAMUSCULAR; INTRAVENOUS at 08:23

## 2017-09-15 RX ADMIN — NITROGLYCERIN 50 MCG: 5 INJECTION, SOLUTION INTRAVENOUS at 13:49

## 2017-09-15 RX ADMIN — DEXAMETHASONE SODIUM PHOSPHATE 4 MG: 4 INJECTION, SOLUTION INTRAMUSCULAR; INTRAVENOUS at 10:43

## 2017-09-15 RX ADMIN — CEFAZOLIN 1 G: 1 INJECTION, POWDER, FOR SOLUTION INTRAMUSCULAR; INTRAVENOUS at 13:06

## 2017-09-15 RX ADMIN — CEFAZOLIN 1 G: 1 INJECTION, POWDER, FOR SOLUTION INTRAMUSCULAR; INTRAVENOUS at 19:11

## 2017-09-15 RX ADMIN — SUGAMMADEX 200 MG: 100 INJECTION, SOLUTION INTRAVENOUS at 13:53

## 2017-09-15 RX ADMIN — Medication 20 MG: at 13:07

## 2017-09-15 RX ADMIN — Medication 30 MG: at 10:59

## 2017-09-15 RX ADMIN — PROPOFOL 200 MG: 10 INJECTION, EMULSION INTRAVENOUS at 08:24

## 2017-09-15 RX ADMIN — ASPIRIN 81 MG: 81 TABLET, COATED ORAL at 20:49

## 2017-09-15 ASSESSMENT — ACTIVITIES OF DAILY LIVING (ADL)
TRANSFERRING: 0-->INDEPENDENT
BATHING: 0-->INDEPENDENT
SWALLOWING: 0-->SWALLOWS FOODS/LIQUIDS WITHOUT DIFFICULTY
RETIRED_COMMUNICATION: 0-->UNDERSTANDS/COMMUNICATES WITHOUT DIFFICULTY
TOILETING: 0-->INDEPENDENT
FALL_HISTORY_WITHIN_LAST_SIX_MONTHS: NO
AMBULATION: 0-->INDEPENDENT
COGNITION: 0 - NO COGNITION ISSUES REPORTED
DRESS: 0-->INDEPENDENT
RETIRED_EATING: 0-->INDEPENDENT

## 2017-09-15 ASSESSMENT — ENCOUNTER SYMPTOMS: SEIZURES: 0

## 2017-09-15 NOTE — ANESTHESIA PREPROCEDURE EVALUATION
Anesthesia Evaluation    ROS/Med Hx    History of anesthetic complications (Postoperative nausea and vomiting)  (-) malignant hyperthermia  Comments: Spike Silva is a 21 y/o male with h/o truncus arteriosus which was repaired with a homograft and VSD patch closure in infancy. In  he underwent RV to PA conduit revision using a 21 mm Biocor St. Raymond bioprosthetic valve along with a Dacron patch augmentation of the homograft. Now he presents with recurrent severe conduit stenosis and severe RV hypertension secondary to a combination of an immobile, stenotic pulmonary valve, and a calcified conduit with distal conduit stenosis.  Due to this the plan for today is right and left heart catheterization with dilation and possible stent placement of the distal conduit as well as Zarina pulmonary valve placement.    Spike has had multiple general anesthetics in the past and tolerated them without problems except postoperative nausea. His mother has a history of PONV as well. No other family history of adverse reactions to anesthesia.    Cardiovascular Findings   (+) congenital heart disease (H/o truncus arteriosus)  Comments:   1. Truncus arteriosus  2. Status post initial pulmonary artery banding in the  period  3. Status post total repair with homograft and VSD patch closure in infancy by Dr. Deras to [facility]  4. Status post RV to PA conduit revision in  using the 21 mm Biocor St. Raymond bioprosthetic valve along with that Dacron patch augmentation of the homograft at [facility]  5. Recurrent conduit stenosis, severe  6. Severe RV hypertension.    Neuro Findings   (-) seizures    Comments: - Migraine headaches    Pulmonary Findings - negative ROS  (-) asthma and recent URI    HENT Findings - negative HENT ROS    Skin Findings - negative skin ROS     Findings   (-) prematurity      GI/Hepatic/Renal Findings   (+) PONV  (-) GERD, liver disease and renal  disease    Endocrine/Metabolic Findings - negative ROS  (-) diabetes and hypothyroidism      Genetic/Syndrome Findings - negative genetics/syndromes ROS    Hematology/Oncology Findings - negative hematology/oncology ROS  (-) blood dyscrasia and clotting disorder          Past Medical History:   Diagnosis Date     Migraine headache      PONV (postoperative nausea and vomiting)      Truncus arteriosus 7/25/2012    RV hypertension, recurrent conduit stenosis         Patient Active Problem List   Diagnosis     Truncus arteriosus             Past Surgical History:   Procedure Laterality Date     CARDIAC SURGERY      Pulmonary artery banding, VSD patch closure, RV to PA conduit revision             Allergies:    Allergies   Allergen Reactions     Amoxicillin Rash           Meds:   Prescriptions Prior to Admission   Medication Sig Dispense Refill Last Dose     albuterol (2.5 MG/3ML) 0.083% neb solution Take 1 vial by nebulization every 6 hours as needed for shortness of breath / dyspnea or wheezing        divalproex (DEPAKOTE ER) 500 MG 24 hr tablet Take 1,000 mg by mouth daily   Taking     cyproheptadine (PERIACTIN) 4 MG tablet Take 6 mg by mouth 2 times daily 1.5 tabs twice daily    Taking     APAP-Isometheptene-Dichloral (MIDRIN PO) Take 1 tablet by mouth at onset of headache May take 2 tabs PRN   Taking           Physical Exam  Normal systems: pulmonary and dental    Airway   Mallampati: II  TM distance: >3 FB  Neck ROM: full    Dental   Comment: Denies loose or chipped teeth.    Cardiovascular   Rhythm and rate: regular and normal  (+) murmur       Pulmonary    breath sounds clear to auscultation          CTA (7/18/2017):  1. Truncus arteriosus status post repair.   2. There is a PA conduit that is severely calcified. It measures 18 mm x 18 mm at its origin/RV insertion, 19 mm x 16 mm in its mid segment, 17 mm x 8 mm at its narrowest segment just prior to the PA bifurcation.  3. Single coronary artery with a posterior  takeoff, that splits into the LAD and a dominant circumflex artery that gives off the PDA. Given the posterior takeoff, the left main or LAD are not in close proximity to the PA conduit.  4. Moderate to severe enlargement of the aortic/truncus root, measuring 5 cm x 4.9 cm at the sinuses of Valsalva.        Echo (6/28/2017):  Post Op Truncus Arteriosis. Technically difficult study due to poor acoustic windows. Normal right ventricular size. Low normal right ventricular systolic function. Normal left ventricular systolic function. The peak gradient in the pulmonary artery homograft is 59 mmHg. Estimated right ventricular systolic pressure is 86.0 mmHg plus right atrial pressure.     Segmental Anatomy:  There is normal atrial arrangement. Concordant atrioventricular connections. Concordant ventriculoarterial connections.     Systemic and pulmonary veins:  The systemic venous return is normal. Color flow demonstrates flow from two pulmonary veins entering the left atrium.     Atria and atrial septum:  Normal right atrial size. The left atrium is normal in size.     Atrioventricular valves:  The tricuspid valve is normal in appearance and motion. Trivial tricuspid valve insufficiency. Estimated right ventricular systolic pressure is 85.8 mmHg plus right atrial pressure. The mitral valve is normal in appearance and motion. There is no mitral valve insufficiency.     Ventricles and Ventricular Septum:  Normal right ventricular size. Low normal right ventricular systolic function. Normal left ventricular size. Normal left ventricular systolic function. Post baffle closure of the ventricular septal defect with direction of the left ventricular outflow to the truncal valve. There is no residual ventricular level shunt.     Outflow tracts:  There is unobstructed flow through the right ventricular outflow tract. Patient after repair of truncus arteriosus. There is unobstructed antegrade flow through the truncal valve. There is  no truncal valve insufficiency. There  is a tricuspid truncal valve. There is unobstructed flow through the left ventricular outflow tract.     Great arteries:  The left pulmonary artery is not seen with this study. Post implantation of valved homograft in the pulmonary position. The peak gradient in the pulmonary artery homograft is 41 mmHg. Normal ascending aorta. The aortic arch appears normal. There is unobstructed antegrade flow in the ascending, transverse arch, descending thoracic and abdominal aorta.     Arterial Shunts:  There is no arterial level shunting.     Coronaries:  The aortic sinuses are rotated clockwise, thus the right coronary arises more anterior than usual and the left main coronary arises more posterior than usual.     Effusions, catheters, cannulas and leads:  No pericardial effusion.        Anesthesia Plan      History & Physical Review  History and physical reviewed and following examination; no interval change.    ASA Status:  3 .    NPO Status:  > 8 hours    Plan for General and ETT with Intravenous and Propofol induction. Maintenance will be Balanced.    PONV prophylaxis:  Ondansetron (or other 5HT-3) and Dexamethasone or Solumedrol  Additional equipment: 2nd IV and Arterial Line     - Premedication with IV midazolam  - Anesthetic plan as well as associated risks discussed with Spike and his parents, all questions answered with agreement to proceed.      Postoperative Care  Postoperative pain management:  IV analgesics and Multi-modal analgesia.      Consents  Anesthetic plan, risks, benefits and alternatives discussed with:  Patient and Parent (Mother and/or Father).  Use of blood products discussed: Yes.   Use of blood products discussed with Patient and Parent (Mother and/or Father).  Consented to blood products.  .          Alyssa Yoder MD  Pediatric Anesthesiologist  Pager: [phone]

## 2017-09-15 NOTE — IP AVS SNAPSHOT
MRN:[id number]                      After Visit Summary   9/15/2017    Spike Silva    MRN: [id number]           Thank you!     Thank you for choosing [facility] for your care. Our goal is always to provide you with excellent care. Hearing back from our patients is one way we can continue to improve our services. Please take a few minutes to complete the written survey that you may receive in the mail after you visit with us. Thank you!        Patient Information     Date Of Birth          1995        Designated Caregiver       Most Recent Value    Caregiver    Will someone help with your care after discharge? yes    Name of designated caregiver Yaron    Phone number of caregiver [phone]    Caregiver address parents home at [place]      About your hospital stay     You were admitted on:  September 15, 2017 You last received care in the:  [facility]    You were discharged on:  September 16, 2017        Reason for your hospital stay       Cardiac cath for Zarina valve placement in the pulmonary position                  Who to Call     For medical emergencies, please call 911.  For non-urgent questions about your medical care, please call your primary care provider or clinic, [phone]  For questions related to your surgery, please call your surgery clinic        Attending Provider     Provider Specialty    Hunter Steward MD Pediatric Cardiology       Primary Care Provider Office Phone # Fax #    Attila Lombardo [phone]       When to contact your care team       Call for any bleeding, swelling, redness, fevers, discharge or change in color temperature or sensation to either leg.    If you have any questions about the site, either your primary care provider or your cardiologist can examine it  To reach [facility] cardiologist at any time  please call [phone] (M-F 7:30 AM- 4:30 PM) or [phone] and ask for the on-call pediatric cardiologist (anytime)                  After Care Instructions     Activity       Avoid vigorous activity for 48 hours to reduce the risk of bleeding from the site            Diet       Regular            Wound care and dressings       Watch the right groin and left groin site closely for any bleeding, swelling, redness, discharge, or change in color/temperature/sensation of the R Leg  Call immediately if there is bleeding or fever  Keep the site clean and dry.  You may leave the site uncovered; if you want to cover it with a band-aid be sure to change the band-aid any time it gets wet or dirty  Do not soak the site (bathe or swim) for 48 hours; okay to shower or sponge-bathe after 24 hours                  Follow-up Appointments     Follow Up and recommended labs and tests       Please follow up with your Primary care physician within 1 week of discharge for a cath site check  Please follow up with Dr. Matt within 1 month   Please follow up with Dr. Galvez, your cardiologist within 2 month of cath date                  Additional Services     Physical Therapy Referral       *This therapy referral will be filter ed to a centralized scheduling office at [facility] and the patient will receive a call to schedule an appointment at a [facility] location most convenient for them. *     [facility] provides Physical Therapy evaluation and treatment and many specialty services across the [facility] system.  If requesting a specialty program, please choose from the list below.    If you have not heard from the scheduling office within 2 business days, please call [phone] for all locations, with the exception of [place], please call [phone].  Treatment: Evaluation & Treatment  Special Instructions/Modalities: Left are weakness and tingling post cardiac cath     Please be aware  "that coverage of these services is subject to the terms and limitations of your health insurance plan.  Call member services at your health plan with any benefit or coverage questions.      **Note to Provider:  If you are referring outside of [facility] for the therapy appointment, please list the name of the location in the \"special instructions\" above, print the referral and give to the patient to schedule the appointment.                  Pending Results     Date and Time Order Name Status Description    9/16/2017 0005 US Lower Extremity Venous Duplex Bilateral Preliminary     9/16/2017 0005 US Lower Extremity Arterial Duplex Bilateral In process             Statement of Approval     Ordered          09/16/17 1227  I have reviewed and agree with all the recommendations and orders detailed in this document.  EFFECTIVE NOW     Approved and electronically signed by:  Tenisha Roberson APRN CNP             Admission Information     Date & Time Provider Department Dept. Phone    9/15/2017 Hunter Steward MD [facility] Pediatric Cardiovascular [phone]      Your Vitals Were     Blood Pressure Pulse Temperature Respirations Height Weight    102/60 86 98.7  F (37.1  C) (Oral) 13 1.803 m (5' 11\") 82.6 kg (182 lb 1.6 oz)    Pulse Oximetry BMI (Body Mass Index)                100% 25.4 kg/m2          [employer] Information     [employer] lets you send messages to your doctor, view your test results, renew your prescriptions, schedule appointments and more. To sign up, go to [email or web address] . Click on \"Log in\" on the left side of the screen, which will take you to the Welcome page. Then click on \"Sign up Now\" on the right side of the page.     You will be asked to enter the access code listed below, as well as some personal information. Please follow the directions to create your username and password.     Your access code is: [email or web address]  Expires: 9/26/2017  2:36 " PM     Your access code will  in 90 days. If you need help or a new code, please call your [facility] clinic or [phone].        Care EveryWhere ID     This is your Care EveryWhere ID. This could be used by other organizations to access your [facility] medical records  [id number]        Equal Access to Services     NAOMIE SHAIKH : Hadii mateo ku hadasho Soomaali, waaxda luqadaha, qaybta kaalmada adeegyada, isabel gilman juan jlia ravijacquiepricilla gutiérrez. So [facility] [phone].    ATENCIÓN: Si habla español, tiene a stevens disposición servicios gratuitos de asistencia lingüística. Andres al [phone].    We comply with applicable federal civil rights laws and [place] laws. We do not discriminate on the basis of race, color, national origin, age, disability sex, sexual orientation or gender identity.               Review of your medicines      START taking        Dose / Directions    aspirin 81 MG EC tablet        Dose:  81 mg   Take 1 tablet (81 mg) by mouth daily   Quantity:  30 tablet   Refills:  0         CONTINUE these medicines which have NOT CHANGED        Dose / Directions    albuterol (2.5 MG/3ML) 0.083% neb solution        Dose:  1 vial   Take 1 vial by nebulization every 6 hours as needed for shortness of breath / dyspnea or wheezing   Refills:  0       cyproheptadine 4 MG tablet   Commonly known as:  PERIACTIN        Dose:  6 mg   Take 6 mg by mouth 2 times daily 1.5 tabs twice daily   Refills:  0       divalproex 500 MG 24 hr tablet   Commonly known as:  DEPAKOTE ER        Dose:  1000 mg   Take 1,000 mg by mouth daily   Refills:  0       MIDRIN PO        Dose:  1 tablet   Take 1 tablet by mouth at onset of headache May take 2 tabs PRN   Refills:  0            Where to get your medicines      These medications were sent to [facility] [place], [place] - [address]  [address] [facility] [address]     Phone:  [phone]     aspirin 81 MG EC tablet                Protect  others around you: Learn how to safely use, store and throw away your medicines at www.disposemymeds.org.             Medication List: This is a list of all your medications and when to take them. Check marks below indicate your daily home schedule. Keep this list as a reference.      Medications           Morning Afternoon Evening Bedtime As Needed    albuterol (2.5 MG/3ML) 0.083% neb solution   Take 1 vial by nebulization every 6 hours as needed for shortness of breath / dyspnea or wheezing                                aspirin 81 MG EC tablet   Take 1 tablet (81 mg) by mouth daily   Last time this was given:  81 mg on 9/16/2017  9:50 AM                                cyproheptadine 4 MG tablet   Commonly known as:  PERIACTIN   Take 6 mg by mouth 2 times daily 1.5 tabs twice daily   Last time this was given:  6 mg on 9/16/2017  9:50 AM                                divalproex 500 MG 24 hr tablet   Commonly known as:  DEPAKOTE ER   Take 1,000 mg by mouth daily   Last time this was given:  1,000 mg on 9/15/2017  8:49 PM                                MIDRIN PO   Take 1 tablet by mouth at onset of headache May take 2 tabs PRN

## 2017-09-15 NOTE — PROVIDER NOTIFICATION
"MD updated of continued tingling/\"feeling asleep\",less control which is unchanged since admission on left upper arm. ROM done with every assessment with no improvement. PT consult done.  "

## 2017-09-15 NOTE — ANESTHESIA CARE TRANSFER NOTE
Patient: Spike Silva    Procedure(s):  Zarina Valve Conduit Stent Placement, Right Ventricle to Pulmonary Artery Conduit Stent As (Standby)   - Wound Class: I-Clean   - Wound Class: I-Clean    Diagnosis: Truncus Status Post Repair, Pulmonary Valve Conduit   Diagnosis Additional Information: No value filed.    Anesthesia Type:   General, ETT     Note:    Patient transferred to:ICU  Comments: Stable, comfortable during transport and arrival to PICU.  Team report given at bedside.      Vitals: (Last set prior to Anesthesia Care Transfer)    CRNA VITALS  9/15/2017 1404 - 9/15/2017 1434      9/15/2017             Resp Rate (set): 10                Electronically Signed By: CASS Florentino CRNA  September 15, 2017  2:34 PM

## 2017-09-15 NOTE — DISCHARGE SUMMARY
[facility]  Cardiac Catheterization & Electrophysiology Laboratory  Discharge Summary    Spike Silva MRN# [id number]   YOB: 1995 Age: 22 year old     Date of Admission:  9/15/2017  Date of Discharge:  2017  Physician:   Dr. Blanco Velasquez     Primary Care Provider: Attila Lombardo           Diagnoses:     History of truncus arteriosus  S/p PA band placement in  period  RV to PA homograft and VSD patch closure in infancy ()  RV to PA conduit revision () using 21 mm Biocor St. 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  He was stable on room air without additional cardiac medications throughout his hospitalization.  He remained flat for 6 hours post cath due to his bilateral arterial and venous groin access sites.  Follow up ultrasounds of cath sites on  showed no evidence of thrombus.   His Z stitch at his left groin was removed the morning of discharge.  He started on 81 mg of Aspirin for valve prophylaxis daily.  He completed 3 additional doses of antibiotics.  His home medications for migraines were restarted.  Following his procedure he complained of left arm numbness, tingling and weakness likely due to positioning during the cath case.  Physical therapy was consulted and exercises were recommended.  He should follow up with outpatient physical therapy following discharge.       CATH FINDINGS:     Baseline Hemoximetry:  Normal mixed venous saturation and systemic saturation.  Normal cardiac output.     Post-Intervention Hemoximetry:  18 mm Askov Balloon dilation of RPA and 16 mm BIB  With placement of 26 mm x 12 mm LD Max stent  Laura Balloon serial dilations with 12 mm x 2 cm, 14 mm, BIB with placmenet of 36 mm x 12 mm LD Max  "stent  Of distal conduit, followed by 18 mm Z med balloon dilation  18 mm Zarina valve implantation  Normal mixed venous and systemic saturation.     Baseline Hemodynamics:  RVSP almost systemic pressure (82 mmHg)  Gradient 40 mmHg between RVOT and conduit  10 mmHg gradient when comparing RPA (systolic 19 mmHg) and LPA (29 mmHg)  Normal cardiac output     Post-Intervention Hemodynamics:  RVSP half systemic pressure (50 mmHg)      Post Op Truncus Arteriosis. Now post transcatheter Zarina valve implantation  in the pulmonary position, stenting of the right ventricle to pulmonary artery  conduit and stenting of the proximal right branch pulmonary artery.     Technically difficult study due to poor acoustic windows. There is no  prosthetic pulmonary valve insufficiency. There is laminar antegrade flow  across the prosthetic pulmonary valve. The peak gradient across the prosthetic  pulmonary valve is 25 mmHg.The peak gradient across the conduit is 45-50 mmHg.  There is unobstructed flow in the right pulmonary artery. The peak gradient in  the right pulmonary artery is 11 mmHg. There is unobstructed flow in the left  pulmonary artery. Estimated right ventricular systolic pressure is 60 mmHg  plus right atrial pressure ( mmHg). Mild right ventiruclar hypertrophy.  Normal right ventricular size. Low normal right ventricular systolic function.  Normal left ventricular systolic function. The aortic root is dilated at the  level of the sinuses of Valsalva. No pericardial effusion.           Discharge Weight and Vitals:   Blood pressure 112/62, pulse 86, temperature 99  F (37.2  C), resp. rate 19, height 1.803 m (5' 11\"), weight 82.6 kg (182 lb 1.6 oz), SpO2 98 %.    General:  Alert, sitting in bed, eating breakfast  CV: RRR, murmur appreciated,  +2 pulses peripherally and centrally, brisk cap refill  Respiratory: LS clear bilaterally, easy WOB   Abd: soft, non-distended, non-tender, normal BS, no hepatomegaly " appreciated  Skin: Pink, warm, no rashes or lesions noted. Bilateral cath sites intact with no bruising edema or drainage noted, mildly tender with palpation.   Musculoskeletal: 5/5 strength in bilateral lower extremities and right upper extremity. 3/5 strength in left extremity. Hand  weak, able to lift against mild resistance, elbow flexion 1/5. Reports numbness and tingling until mid upper arm.   CNS:  Alert and oriented, pupils brisk, equal and reactive. Ambulating well.            Follow-Up Appointments:   Primary Care Provider: 1 week(s)  Primary Cardiologist:  2 month(s)  Hunter Ocampo MD: 1 month          Wound Care, Monitoring, and Other Instructions:     Watch the right and left groin site closely for any bleeding, swelling, redness, discharge, or change in color/temperature/sensation of the R and L leg    Call immediately if there is bleeding or fever    Keep the site clean and dry    You may leave the site uncovered; if you want to cover it with a band-aid be sure to change the band-aid any time it gets wet or dirty    Avoid vigorous activity for 48 hours to reduce the risk of bleeding from the site    Do not soak the site (bathe or swim) for 48 hours; okay to shower or sponge-bathe after 24 hours    If you have any questions about the site, either your primary care provider or your cardiologist can examine it    To reach [facility] cardiologist at any time please call [phone] (M-F 7:30 AM- 4:30 PM) or [phone] and ask for the on-call pediatric cardiologist (anytime)      I, Blanco Velasquez MD, saw and evaluated this patient prior to discharge.  I personally reviewed vital signs, medications, labs and imaging.    I personally spent 25 minutes on discharge activities.

## 2017-09-15 NOTE — H&P
Pediatric Cardiac Critical Care Progress Note    Interval Events: Spike is s/p cardiac cath. Access was 7 Fr RFV (upsized to 22 Fr), 7 Fr LFV, 5 Fr LFA. RV pressures systemic (85), SVO2 75%, systemic SpO2 normal prior to intervention. Gradient across PV (50), across distal conduit (20) and further gradient in to proximal RPA (10). 26 mm stent placed in proximal RPA, after serial balloon dilations of distal conduit, 36 mm stent placed in distal conduit, after balloon dilation of pulmonary valve, inner diameter 18 mm Zarina valve placed with good result RV systolic pressure decreased to half systemic (50), gradient from RV to RPA (25).    Assessment: Spike is a 22 year male with h/o Truncus arteriosus, status post initial pulmonary artery banding in the  period, status post total repair with homograft and VSD patch closure in infancy , Status post RV to PA conduit revision in  using the 21 mm Biocor [place]. 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 He had cardiac cath on 9/15/17 for stent placement in proximal RPA, distal RV-PA conduit and Zarina valve placement in RV-PA conduit.    Plan:    CVS:   - Bedrest complete at   - ASA 81 mg QD  - Echo in am  - Z-stitch from right groin site will be removed tomorrow  - Discharge home tomorrow    Resp:   - Stable in RA  - F/u CXR at  to check stent position/Zarina valve position, for any pleural effusion or pneumothorax    FEN/Renal/GI:   - Clear liquid diet  - Can advance diet to regular once bedrest is over    Heme:   - Post cath Hb 14 stable  - Hematoma in left groin site, feels soft on exam, good pulses felt in left leg, continue to monitor for any deterioration in perfusion in left lower extremity  - US BL LE arterial and venous to check for any thrombi  ID:   - Ancef x 3 doses post cath    Endo:   - No issues    CNS:   - Numbness and tingling in left  arm likely secondary to stretching of arm  - Apply warm compress and use stress ball to do exercises  - PT consult for arm exercise    History:  Spike is a 22 year male with h/o Truncus arteriosus, status post initial pulmonary artery banding in the  period, status post total repair with homograft and VSD patch closure in infancy , Status post RV to PA conduit revision in  using the 21 mm Biocor [place]. 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 He had cardiac cath on 9/15/17 for stent placement in proximal RPA, distal RV-PA conduit and Jose valve placement in RV-PA conduit.    EXAM:    Cardiovascular: PMI normal. No lifts, heaves, or thrills. RRR. 3/6 systolic ejection murmur LUSB, no clicks, gallops or rub. Peripheral pulses normal in upper and lower extremities bilaterally  Respiratory:  Lungs clear BL. No wheeze/rales/rhonchi  Head: negative, Normocephalic. No masses, lesions, tenderness or abnormalities  ENT: ENT exam normal, MMM  Abdomen: Abdomen soft, non-tender. BS normal. No masses, organomegaly  NEURO: Sensation grossly WNL. Feeling of numbness and tingling in left arm, able to move arm well against gravity.  SKIN: Z-stitch in right groin site. Small hematoma in left groin site, feels soft on palpation, no tenderness      All vital signs reviewed.      Pediatric Critical Care Progress Note:    Spike Silva remains critically ill with need for assessment after cardiac cath and multiple level pulmonary arterial stenting and placement of jose valve with decreased perfusion in lower extremity and hematoma at cath site, along with risk for hemorrhage from stent/balloon sites.    I personally examined and evaluated the patient today. All physician orders and treatments were placed at my direction.  Discussed with the house staff team or resident(s) and agree with the findings and plan in this  note.  I have evaluated all laboratory values and imaging studies from the past 24 hours.  Consults ongoing and ordered are Cardiology  I personally managed the antibiotic therapy, pain management, metabolic abnormalities, and nutritional status.   Key decisions made today included ancef x3 doses, vascular ultrasounds in AM, recheck hgb this evening, frequent assessment of cath sites.  Procedures that will happen today are: none  The above plans and care have been discussed with patient and all questions and concerns were addressed.  I spent a total of 35 minutes providing critical care services at the bedside, and on the critical care unit, evaluating the patient, directing care and reviewing laboratory values and radiologic reports for Spike Silva.    Blanco Velasquez M.D.  Pediatric Critical Care Medicine  Pager: [phone]

## 2017-09-15 NOTE — LETTER
[facility] PEDIATRIC CARDIOVASCULAR ICU  [address] [address]  Phone: [phone]    September 16, 2017        Spike Silva  [facility] [place] [address]          To whom it may concern:    RE: Spike Silva    Patient may return to work Monday with the following:  Light duty-unable to lift more than 5 pounds for 1 week.  Further activity restrictions per Physicial therapist.     Please contact me for questions or concerns.      Sincerely,

## 2017-09-15 NOTE — ANESTHESIA POSTPROCEDURE EVALUATION
Patient: Spike Silva    Procedure(s):  Zarina Valve Conduit Stent Placement, Right Ventricle to Pulmonary Artery Conduit Stent As (Standby)   - Wound Class: I-Clean   - Wound Class: I-Clean    Diagnosis: Truncus Status Post Repair, Pulmonary Valve Conduit     Anesthesia Type:  General, ETT    Note:  Anesthesia Post Evaluation    Patient location during evaluation: ICU (CV-ICU)  Patient participation: Able to fully participate in evaluation  Level of consciousness: awake  Pain management: adequate  Airway patency: patent  Cardiovascular status: acceptable and hemodynamically stable  Respiratory status: acceptable, nonlabored ventilation, spontaneous ventilation and face mask (3 LPM O2)  Hydration status: acceptable  PONV: none     Anesthetic complications: None          Last vitals:  Vitals:    09/15/17 1445 09/15/17 1500 09/15/17 1515   BP: 112/62     Pulse:      Resp: 16 12 19   Temp: 37.1  C (98.7  F) 37.1  C (98.8  F) 37.2  C (99  F)   SpO2: 99% 100% 98%         Spike Silva tolerated his procedure and anesthesia without apparent complications. He was extubated at the end of the procedure and then directly transported to CV-ICU on full monitors with stable vital signs and good spontaneous respiration.  Care was transferred to the CV-ICU team after a comprehensive report was given and all anesthesia related questions were answered.    Alyssa Yoder MD  Pediatric Anesthesiologist  Pager: [phone]    September 15, 2017  3:16 PM

## 2017-09-15 NOTE — ANESTHESIA PROCEDURE NOTES
Arterial Line Procedure Note  Staff:     Anesthesiologist:  NOLBERTO YODER  Location: In OR After Induction  Procedure Start/Stop Times:     patient identified, IV checked, risks and benefits discussed, informed consent, monitors and equipment checked and pre-op evaluation    Line Placement:     Procedure:  Arterial Line    Insertion Site:  Radial    Insertion laterality:  Left    Skin Prep: Chloraprep      Patient Prep: patient draped, mask, sterile gloves, hat and hand hygiene      Local skin infiltration:  None    Ultrasound Guided?: No      Catheter size:  20 gauge, 12 cm    Cath secured with: other (comment)      Cath secured with comment:  Steri-strips    Dressing:  Tegaderm    Complications:  None obvious    Arterial waveform: Yes      IBP within 10% of NIBP: Yes    Assessment/Narrative:      Insertion of left radial arterial line with sterile Seldinger technique, wire and catheter thread with ease, good waveform, no complications.    Nolberto Yoder MD  Pediatric Anesthesiologist  Pager: [phone]

## 2017-09-15 NOTE — H&P
[facility]   Heart Center Consult Note    Pediatric cardiology was asked to consult on this patient post cath procedure           Assessment and Plan:     Spike is a 22 year male with h/o Truncus arteriosus, status post initial pulmonary artery banding in the  period, status post total repair with homograft and VSD patch closure in infancy , Status post RV to PA conduit revision in  using the 21 mm Biocor [place]. 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 He had cardiac cath on 9/15/17 for stent placement in proximal RPA, distal RV-PA conduit and Zarina valve placement in RV-PA conduit.    Cath findings:   Baseline Hemoximetry:  Normal mixed venous saturation and systemic saturation.  Normal cardiac output.     Post-Intervention Hemoximetry:  18 mm Laura Balloon dilation of RPA and 16 mm BIB  With placement of 26 mm x 12 mm LD Max stent  [place] Balloon serial dilations with 12 mm x 2 cm, 14 mm, BIB with placmenet of 36 mm x 12 mm LD Max stent  Of distal conduit, followed by 18 mm Z med balloon dilation  Zarina TPV 20  implantation (18 mm outer balloon)  Normal mixed venous and systemic saturation.     Baseline Hemodynamics:  RVSP almost systemic pressure (82 mmHg)  Gradient 40 mmHg between RVOT and conduit  10 mmHg gradient when comparing RPA (systolic 19 mmHg) and LPA (29 mmHg)  Normal cardiac output     Post-Intervention Hemodynamics:  RVSP half systemic pressure (50 mmHg)     Angiography:  Baseline PA angiogram: Most discrete narrowing measured 9.3 mm at distal conduit and proximal RPA measured 8.2 mm (compared to LPA measuring 16 mm)  RVOT angiogram: Narrowest diameter at valve prior to intervention about 8 mm.  Truncal root angiogram: NO significant insufficient.  Dilation of truncal root.  No arch obstruction.  RV-PA Conduit angiogram  LCA angiogram:  Filling of LCA without  stenosis branching into LAD and circumflex.  Retrograde filling of right coronary arteries.  No main RCA visualized.     Post PA/distal conduit angiogram:  No extravasation of contrast following stent placement.  Improvement in diameter of RPA and distal conduit.  No paravalvular leak after Zarina valve implantation.      Recommendations:  CVS:   - Bedrest complete at   - ASA 81 mg QD  - Echo in am  - Z-stitch from right groin site will be removed tomorrow  - Discharge home tomorrow    Resp:   - Stable in RA  - F/u CXR at  to check stent position/Zarina valve position, for any pleural effusion or pneumothorax    FEN/Renal/GI:   - Clear liquid diet  - Can advance diet to regular once bedrest is over    Heme:   - Post cath Hb 14 stable  - Hematoma in left groin site, feels soft on exam, good pulses felt in left leg, continue to monitor for any deterioration in perfusion in left lower extremity  - US BL LE arterial and venous to check for any thrombi    ID:   - Ancef x 3 doses post cath    Endo:   - No issues    CNS:   - Numbness and tingling in left arm likely secondary to stretching of arm  - Apply warm compress and use stress ball to do exercises  - PT consult for arm exercise      Lilian Mittal MD,   Fellow, Pediatric Cardiology         Attending Attestation:     Attestation:  This patient has been seen and evaluated by me, Betty Han.  Discussed with the medical student, house staff team and/or resident(s) and agree with the findings and plan in this note.  I have reviewed today's vital signs, medications, labs and imaging.  eBtty Han MD        History of Present Illness:     Spike is a 22 year male with h/o Truncus arteriosus, status post initial pulmonary artery banding in the  period, status post total repair with homograft and VSD patch closure in infancy , Status post RV to PA conduit revision in  using the 21 mm Biocor [place]. 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 He had cardiac cath on 9/15/17 for stent placement in proximal RPA, distal RV-PA conduit and Zarina valve placement in RV-PA conduit.      PMH:    As above      Family History:   Unremarkable for congenital heart disease      Social History:   Lives with family in [place], [place]        Review of Systems:   ROS: 10 point ROS neg other than the symptoms noted above in the HPI.       Medications:   I have reviewed this patient's current medications   phenylephrine (MARIMAR-SYNEPHRINE) infusion PEDS/NICU         aspirin EC  81 mg Oral Daily     ceFAZolin  1 g Intravenous Q6H   acetaminophen      Physical Exam:   Vital Ranges Hemodynamics   Temp:  [98.2  F (36.8  C)-99.5  F (37.5  C)] 99.5  F (37.5  C)  Pulse:  [86] 86  Heart Rate:  [75-83] 83  Resp:  [12-21] 12  BP: (112-119)/(56-72) 112/62  MAP:  [82 mmHg-86 mmHg] 85 mmHg  Arterial Line BP: (113-120)/(64-68) 120/68  SpO2:  [96 %-100 %] 100 % Arterial Line BP: (113-120)/(64-68) 120/68  MAP:  [82 mmHg-86 mmHg] 85 mmHg  BP - Mean:  [78-81] 78     Vitals:    09/15/17 0600   Weight: 82.6 kg (182 lb 1.6 oz)   Weight change:   I/O last 3 completed shifts:  In: 1000 [I.V.:1000]  Out: 253 [Urine:253]    General - Comfortable, lying in bed, in no apparent distress   HEENT - Normocephalic. No masses, lesions, tenderness or abnormalities  ENT: ENT exam normal, MMM   Cardiac - No lifts, heaves, or thrills. RRR. 3/6 systolic ejection murmur LUSB, no clicks, gallops or rub.    Respiratory - Clear to auscultation BL, no wheeze/rales or rhonchi   Abdominal - Abdomen soft, non-tender. BS normal. No masses, organomegaly   Ext / Skin - Peripheral pulses normal in upper and lower extremities bilaterally. Z stitch in right groin. Feeling of numbness and tingling in left arm, able to move arm well against gravity. Small hematoma in left groin site, feels soft on palpation   Neuro - Numbness and  tingling in left arm, able to move arm well against gravity. Sensation grossly intact in all 4 extremities.          Labs       Recent Labs  Lab 09/15/17  1328 09/15/17  0936 09/15/17  0917 09/15/17  0851    140 140 141   POTASSIUM 3.8 4.1 3.8 3.9   CHLORIDE  --   --   --  107   CO2  --   --   --  26   BUN  --   --   --  12   CR  --   --   --  0.70   TAYLOR  --   --   --  8.1*      Recent Labs  Lab 09/15/17  0851   ALBUMIN 3.3*      Recent Labs  Lab 09/15/17  1328 09/15/17  0936 09/15/17  0917   LACT 2.1* 2.0 2.0      Recent Labs  Lab 09/15/17  1328 09/15/17  0936 09/15/17  0917 09/15/17  0851   HGB 14.8 14.6 14.7 14.8   PLT  --   --   --  112*   PTT  --   --   --  33   INR  --   --   --  1.13      Recent Labs  Lab 09/15/17  0851   WBC 5.9    No lab results found in last 7 days.   ABG  Recent Labs  Lab 09/15/17  1328 09/15/17  0936   PH 7.45 7.45   PCO2 35 38   PO2 143* 107*   HCO3 24 26    VBGNo results for input(s): PHV, PCO2V, PO2V, HCO3V in the last 168 hours.   Attestation:  This patient has been seen and evaluated by me, Betty Han.  Discussed with the medical student, house staff team and/or resident(s) and agree with the findings and plan in this note.  I have reviewed today's vital signs, medications, labs and imaging.  Betty Han MD

## 2017-09-15 NOTE — BRIEF OP NOTE
[facility]  BRIEF POST-PROCEDURE NOTE    Patient Name: Spike Silva   Age: 22 year old    : 1995                                                                      MRN: [id number]   Date of Service: 9/15/2017                                                                                       Attending: Dr. Matt  PCP: Attila Lombardo         Pre Cath CRISP score 6  Risk Category 3 (6.2%)    Pre-procedure diagnosis 1. Truncus arteriosus      - s/p pulmonary artery banding in the  period      - s/p total repair with homograft and VSD patch             closure in infancy       - s/p RV to PA conduit revision in  using the 21           mm Biocor [place]. Raymond bioprosthetic valve along with        that Dacron patch augmentation of the homograft   2.  Distal RV-PA conduit stenosis  3.  [place] Raymond Biocor pulmonary valve stenosis  4.  Proximal RPA stenosis  5.  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 right and retrograde left heart cath  2. Angiography  3. Balloon dilation/stent placement of distal RV-PA conduit  4. Balloon dilation/stent placement of proximal RPA  5. Zarina TPV 20  implantation    Staff Dr. Matt; Dr. Tomlinson   Assistant(s) Isaak Bennett DO; Patricia Willis PA-C   Anesthesia general anesthesia and local with 1% lidocaine   Access 22F RFV (up sized from 7F); 7LFV; 5F LFA    Specimens  None   IV contrast 287 mL   Heparinized Yes   Blood loss 15 mL   Complications None     Preliminary findings:    Baseline Hemoximetry:  Normal mixed venous saturation and systemic saturation.  Normal cardiac output.    Post-Intervention Hemoximetry:  18 mm Laura Balloon dilation of RPA and 16 mm BIB  With placement of 26 mm x 12 mm LD Max stent  Laura Balloon serial dilations with 12 mm x 2 cm, 14 mm, BIB with placmenet of 36 mm x 12 mm LD Max stent  Of distal conduit, followed by 18 mm Z med balloon dilation  Zarina TPV 20   implantation (18 mm outer balloon)  Normal mixed venous and systemic saturation.    Baseline Hemodynamics:  RVSP almost systemic pressure (82 mmHg)  Gradient 40 mmHg between RVOT and conduit  10 mmHg gradient when comparing RPA (systolic 19 mmHg) and LPA (29 mmHg)  Normal cardiac output    Post-Intervention Hemodynamics:  RVSP half systemic pressure (50 mmHg)    Angiography:  Baseline PA angiogram: Most discrete narrowing measured 9.3 mm at distal conduit and proximal RPA measured 8.2 mm (compared to LPA measuring 16 mm)  RVOT angiogram: Narrowest diameter at valve prior to intervention about 8 mm.  Truncal root angiogram: NO significant insufficient.  Dilation of truncal root.  No arch obstruction.  RV-PA Conduit angiogram  LCA angiogram:  Filling of LCA without stenosis branching into LAD and circumflex.  Retrograde filling of right coronary arteries.  No main RCA visualized.    Post PA/distal conduit angiogram:  No extravasation of contrast following stent placement.  Improvement in diameter of RPA and distal conduit.  No paravalvular leak after Zarina valve implantation.    Plan: Transfer to CVICU. Hgb/Hct this evening. To have CXR this evening, echo in AM, Right groin venous & arterial duplex US in AM.  Start aspirin 81 mg tonight and continue daily. Z stitch out tomorrow.    Isaak Bennett DO  Fellow, Pediatric Cardiology   [facility]  9/15/2017 3:06 PM   Pager:  [phone]

## 2017-09-15 NOTE — IP AVS SNAPSHOT
[facility] Pediatric Cardiovascular ICU    [address] [address]    Phone:  [phone]                                       After Visit Summary   9/15/2017    Spike Silva    MRN: [id number]           After Visit Summary Signature Page     I have received my discharge instructions, and my questions have been answered. I have discussed any challenges I see with this plan with the nurse or doctor.    ..........................................................................................................................................  Patient/Patient Representative Signature      ..........................................................................................................................................  Patient Representative Print Name and Relationship to Patient    ..................................................               ................................................  Date                                            Time    ..........................................................................................................................................  Reviewed by Signature/Title    ...................................................              ..............................................  Date                                                            Time

## 2017-09-15 NOTE — PROGRESS NOTES
[facility]      Pre cath progress note    Patient Name: Spike Silva   Age: 22 year old    : 1995                                                                      MRN: [id number]   Date of Service: 9/15/2017                                                                                       Attending: Dr. Matt  PCP: Attila Lombardo                                                                                              HPI: Spike Silva is a 22 year male with h/o Truncus arteriosus, status post initial pulmonary artery banding in the  period, status post total repair with homograft and VSD patch closure in infancy , Status post RV to PA conduit revision in  using the 21 mm Biocor [place]. 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     He is here for a cardiac catheterization, full hemodynamic and angiographic assessment, possible balloon dilation/stent placement of conduit and right pulmonary artery, possible pulmonary vasoreactivity testing, possible transcatheter implantation of Zarina valve today.  The anticipated cath site was inspected, and there is no evidence of infection.  Patient was examined and consented.  Risks and benefits of the procedure were explained in detail and all questions were answered.  Ok to proceed with procedure at this time.       Isaak Bennett DO  Fellow, Pediatric Cardiology   [facility]  9/15/2017 7:00 AM  Pager:  [phone]

## 2017-09-16 ENCOUNTER — APPOINTMENT (OUTPATIENT)
Dept: ULTRASOUND IMAGING | Facility: CLINIC | Age: 22
End: 2017-09-16
Attending: PEDIATRICS
Payer: COMMERCIAL

## 2017-09-16 ENCOUNTER — APPOINTMENT (OUTPATIENT)
Dept: CARDIOLOGY | Facility: CLINIC | Age: 22
End: 2017-09-16
Attending: PEDIATRICS
Payer: COMMERCIAL

## 2017-09-16 ENCOUNTER — RECORDS - HEALTHEAST (OUTPATIENT)
Dept: ADMINISTRATIVE | Facility: OTHER | Age: 22
End: 2017-09-16

## 2017-09-16 ENCOUNTER — APPOINTMENT (OUTPATIENT)
Dept: PHYSICAL THERAPY | Facility: CLINIC | Age: 22
End: 2017-09-16
Attending: PEDIATRICS
Payer: COMMERCIAL

## 2017-09-16 VITALS
TEMPERATURE: 98.7 F | SYSTOLIC BLOOD PRESSURE: 102 MMHG | DIASTOLIC BLOOD PRESSURE: 60 MMHG | WEIGHT: 182.1 LBS | OXYGEN SATURATION: 100 % | HEIGHT: 71 IN | HEART RATE: 86 BPM | BODY MASS INDEX: 25.49 KG/M2 | RESPIRATION RATE: 13 BRPM

## 2017-09-16 PROBLEM — Z95.2 HISTORY OF ARTIFICIAL HEART VALVE: Status: ACTIVE | Noted: 2017-09-16

## 2017-09-16 PROCEDURE — 25000128 H RX IP 250 OP 636: Performed by: PEDIATRICS

## 2017-09-16 PROCEDURE — 97110 THERAPEUTIC EXERCISES: CPT | Mod: GP

## 2017-09-16 PROCEDURE — 93325 DOPPLER ECHO COLOR FLOW MAPG: CPT

## 2017-09-16 PROCEDURE — 25000132 ZZH RX MED GY IP 250 OP 250 PS 637: Performed by: PEDIATRICS

## 2017-09-16 PROCEDURE — 97162 PT EVAL MOD COMPLEX 30 MIN: CPT | Mod: GP

## 2017-09-16 PROCEDURE — 40000918 ZZH STATISTIC PT IP PEDS VISIT

## 2017-09-16 PROCEDURE — 93925 LOWER EXTREMITY STUDY: CPT

## 2017-09-16 PROCEDURE — 97530 THERAPEUTIC ACTIVITIES: CPT | Mod: GP

## 2017-09-16 PROCEDURE — 93970 EXTREMITY STUDY: CPT

## 2017-09-16 RX ADMIN — ASPIRIN 81 MG: 81 TABLET, COATED ORAL at 09:50

## 2017-09-16 RX ADMIN — CEFAZOLIN 1 G: 1 INJECTION, POWDER, FOR SOLUTION INTRAMUSCULAR; INTRAVENOUS at 00:36

## 2017-09-16 RX ADMIN — Medication 6 MG: at 09:50

## 2017-09-16 NOTE — PROGRESS NOTES
" 17 1100      Language English   Living Environment   Lives With other (see comments)  (College student on campus, will likely go home w/family )   Living Arrangements other (see comments)  (no concerns w/accessibility at this time)   Transportation Available (patient typically drives, family will drive for now)   Self-Care   Dominant Hand right   Usual Activity Tolerance excellent   Current Activity Tolerance moderate   Regular Exercise yes   Activity/Exercise Type (work duties, likes hunting, fishing)   Equipment Currently Used at Home none   Activity/Exercise/Self-Care Comment Employed as community protections officer. Student.    Functional Level Prior   Ambulation 0-->independent   Transferring 0-->independent   Toileting 0-->independent   Bathing 0-->independent   Dressing 0-->independent   Eating 0-->independent   Communication 0-->understands/communicates without difficulty   Swallowing 0-->swallows foods/liquids without difficulty   Cognition 0 - no cognition issues reported   Fall history within last six months no   Which of the above functional risks had a recent onset or change? none   Prior Functional Level Comment Independent and with no deficits.    General Information   Onset of Illness/Injury or Date of Surgery - Date 09/15/17   Referring Physician Lilian Wilson MD   Patient/Family Goals Statement \"Get my arm back to normal\"   Pertinent History of Current Problem (include personal factors and/or comorbidities that impact the POC) Spike is a 22 year male with h/o Truncus arteriosus, status post initial pulmonary artery banding in the  period, status post total repair with homograft and VSD patch closure in infancy , Status post RV to PA conduit revision in  using the 21 mm Biocor St. 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 He had cardiac cath on 9/15/17 for stent " "placement in proximal RPA, distal RV-PA conduit and Zarina valve placement in RV-PA conduit. Spike has L UE weakness, numbness after his surgery, likely felt to be due to positioning with arms overhead for duration of surgery.    Precautions/Limitations no known precautions/limitations   Weight-Bearing Status - LUE weight-bearing as tolerated   Weight-Bearing Status - RUE weight-bearing as tolerated   Cognitive Status Examination   Orientation orientation to person, place and time   Level of Consciousness alert   Follows Commands and Answers Questions 100% of the time;able to follow multistep instructions   Personal Safety and Judgment intact   Memory intact   Cognitive Comment No concerns   Pain Assessment   Patient Currently in Pain No   Integumentary/Edema   Integumentary/Edema (mild soft, non-pitting edema fingers and dorsal L hand)   Posture    Posture Forward head position;Protracted shoulders   Posture Comments Holds L shoulder elevated, internally rotated, scap protraction LEFT only   Range of Motion (ROM)   ROM Comment Grossly WNL for L UE with PROM, does guard slightly and report most movements feel \"tight\" and \"strange\". R UE, B LE ROM grossly WNL/without concern   Strength   Manual Muscle Testing Quick Adds MMT: Elbow/Forearm;MMT: Wrist;MMT: Hand (General);MMT: Shoulder   Strength Comments R UE grossly 5/5. LE's not tested   MMT: Shoulder   Shoulder Flexion - Left Side (3+/5) fair plus, left   Shoulder Extension - Left Side (4-/5) good minus, left   Shoulder ABduction - Left Side (2+/5) poor plus, left   Shoulder ADduction - Left Side (4-/5) good minus, left   MMT: Elbow/Forearm, Rehab Eval   Elbow Flexion - Left Side (1+/5) trace plus, left   Elbow Extension - Left Side (3/5) fair, left   Forearm Supination - Left Side (3+/5) fair plus, left   Forearm Pronation - Left Side (3+/5) fair plus, left   MMT: Hand   Hand Muscle Testing Results  grossly reduced on LEFT.    MMT: Wrist   Wrist Muscle Testing " Results Wrist flexion and extension grossly antigravity, substitutes with finger flexion.    Bed Mobility   Bed Mobility Comments Independent/no concerns   Transfer Skills   Transfer Comments Independent/no concerns   Gait   Gait Comments Ambulation in room and on unit independently. No concerns   Balance   Balance Comments Not assessed   Sensory Examination   Sensory Perception Comments WNL R UE, B LE's. Decreased light touch sensation AND subjective numbness and tingling C5-C6 dermatome most prominently, C7 also affected, C8 nearly NORMAL proximally, but not so distally.    Coordination   Coordination Comments Impaired R UE, as expected w/weakness and sensory loss   General Therapy Interventions   Planned Therapy Interventions ROM;strengthening;stretching;home program guidelines;progressive activity/exercise   Clinical Impression   Criteria for Skilled Therapeutic Intervention yes, treatment indicated   PT Diagnosis Impaired self cares and ability to lift, push and pull due to L UE weakness/numbness   Influenced by the following impairments L UE numbness, weakness   Functional limitations due to impairments Difficulty with self cares, lifting, pushing and pulling   Clinical Presentation Unstable/Unpredictable   Clinical Presentation Rationale Recent onset, significant symptom variability expected with neural structure injury   Clinical Decision Making (Complexity) Moderate complexity   Therapy Frequency` (1 time eval and treat)   Predicted Duration of Therapy Intervention (days/wks) 1 day   Anticipated Discharge Disposition Home with Outpatient Therapy   Risk & Benefits of therapy have been explained Yes   Patient, Family & other staff in agreement with plan of care Yes   Clinical Impression Comments Spike presents with likely neural traction (vs. compression) injury of L brachial plexus. C5 dermatome and myotome appear to be most prominently affected, but essentially has at least some degree of involvement  throughout L UE. Spike and his parents were advised on typical trajectory of this type of injury and plan for further therapies. Provided HEP today to promote return to function and discussed plan to use UE as much as possible, and position for comfort. Would not necessarily advise sling as suspect patient may have worse position of scap girdle and more guarding with use than w/out. This can be assessed further by his OP therapist.    Total Evaluation Time   Total Evaluation Time (Minutes) 5

## 2017-09-16 NOTE — PLAN OF CARE
"Problem: Goal Outcome Summary  Goal: Goal Outcome Summary  Outcome: Improving  Cintron/ art line/ PIV Dc'ed,  2nd PIV left in place. Patient  advanced to regular diet, ate pizza with family. Up to chair x1 in evening, ambulated to bathroom, did void after cintron removal. Continues to C/O   weakness in upper left extremity, able to move fingers but does not  tight on command. At times patient raises arm independently and other times he states he is not able to.  + pulses/warm and well perfused. Patient states that there is no pain but voicing frustration  with it \"not waking up\".  Patient holds arm limp. Initially patient having mom provide all cares including feeding and giving him water even when  this nurse and his father/girlfriend encouraged  him to eat with his right arm ( non affected arm).   Did feed himself some pizza after encouragement. ROM per mom, lotion and essential oils provided for massage. Plan for PT to visit today and evaluate. Tylenol given x1  for comfort.   Left cath site continues slightly  raw, minimal  oozing since last evening. Bandaid in place to be able to assess easily.  Right cath site with suture in place. 2 + pulses in all extremities, warm. Plan for echo and ultrasound this am.       "

## 2017-09-16 NOTE — PLAN OF CARE
Problem: Goal Outcome Summary  Goal: Goal Outcome Summary  Spike was evaluated by PT today.     Spike presents with likely neural traction (vs. compression) injury of L brachial plexus. C5 dermatome and myotome appear to be most prominently affected, but essentially has at least some degree of involvement throughout L UE. Spike and his parents were advised on typical trajectory of this type of injury and plan for further therapies. Provided HEP today to promote return to function and discussed plan to use UE as much as possible, and position for comfort.  Would not necessarily advise sling as suspect patient may have worse position of scap girdle and more guarding with use than w/out.      Spike reports slight decrease in discomfort after doing PT exercises. Presents as quite fearful and scared about what is happening with his arm. Support and education provided.      Would strongly recommend ongoing Outpatient PT services to promote return to function. Order placed by primary team (encouraged STAT/ASAP per this writer). Spike and family selected [facility]--PT provided with location, information. Recommended calling on Monday to schedule.      Spkie is otherwise mobilizing well and is appropriate for discharge from a PT standpoint.      Physical Therapy Discharge Summary     Reason for therapy discharge:    Discharged to home with outpatient therapy.     Progress towards therapy goal(s). See goals on Care Plan in [facility] electronic health record for goal details.  Goals met     Therapy recommendation(s):    Continued therapy is recommended.  Rationale/Recommendations:  As above.

## 2017-09-16 NOTE — PLAN OF CARE
Problem: Goal Outcome Summary  Goal: Goal Outcome Summary  Outcome: Adequate for Discharge Date Met:  09/16/17  Ultrasound and echo completed and provider reviewed prior to discharge. Pt did have a small amount of oozing at left groin site during and after the ultrasound. Dr. Tomlinson notified and assessed prior to discharge. Z stich suture removed by NP Da from right groin. Pt advised to call if bleeding increases at home. Pt ambulated in [address] with family. No pain or bleeding noted with ambulation. PT worked with patient due to continued numbness and tingling in left arm. Outpatient therapy arranged for patient. Pt given morning dose of aspirin. Education completed with pt about taking aspirin. Discharge paper work reviewed and pt verbalized understanding of discharge instructions.

## 2017-09-18 ENCOUNTER — TELEPHONE (OUTPATIENT)
Dept: CARDIOLOGY | Facility: CLINIC | Age: 22
End: 2017-09-18

## 2017-09-18 NOTE — TELEPHONE ENCOUNTER
Left a message for Spike to see how he is doing after his heart cath procedure on Friday.  I am especially hopeful that his arm sensation is feeling better today.   I asked him to call me back at [phone].

## 2017-09-20 ENCOUNTER — COMMUNICATION - HEALTHEAST (OUTPATIENT)
Dept: PEDIATRICS | Facility: CLINIC | Age: 22
End: 2017-09-20

## 2017-09-25 ENCOUNTER — COMMUNICATION - HEALTHEAST (OUTPATIENT)
Dept: PEDIATRICS | Facility: CLINIC | Age: 22
End: 2017-09-25

## 2017-09-25 ENCOUNTER — OFFICE VISIT - HEALTHEAST (OUTPATIENT)
Dept: PEDIATRICS | Facility: CLINIC | Age: 22
End: 2017-09-25

## 2017-09-25 DIAGNOSIS — Z87.898 NO POST-OP COMPLICATIONS: ICD-10-CM

## 2017-09-25 DIAGNOSIS — Z98.890 S/P REPAIR OF TRUNCUS ARTERIOSUS: ICD-10-CM

## 2017-10-16 DIAGNOSIS — Q20.0 TRUNCUS ARTERIOSUS: Primary | ICD-10-CM

## 2017-10-16 DIAGNOSIS — Z95.2 HISTORY OF ARTIFICIAL HEART VALVE: ICD-10-CM

## 2017-11-01 ENCOUNTER — OFFICE VISIT (OUTPATIENT)
Dept: PEDIATRIC CARDIOLOGY | Facility: CLINIC | Age: 22
End: 2017-11-01
Attending: PEDIATRICS
Payer: COMMERCIAL

## 2017-11-01 ENCOUNTER — RECORDS - HEALTHEAST (OUTPATIENT)
Dept: ADMINISTRATIVE | Facility: OTHER | Age: 22
End: 2017-11-01

## 2017-11-01 ENCOUNTER — HOSPITAL ENCOUNTER (OUTPATIENT)
Dept: CARDIOLOGY | Facility: CLINIC | Age: 22
Discharge: HOME | End: 2017-11-01
Attending: PEDIATRICS | Admitting: PEDIATRICS
Payer: COMMERCIAL

## 2017-11-01 VITALS
RESPIRATION RATE: 16 BRPM | DIASTOLIC BLOOD PRESSURE: 63 MMHG | BODY MASS INDEX: 25.37 KG/M2 | OXYGEN SATURATION: 99 % | SYSTOLIC BLOOD PRESSURE: 130 MMHG | WEIGHT: 181.22 LBS | HEART RATE: 88 BPM | HEIGHT: 71 IN

## 2017-11-01 DIAGNOSIS — Z95.4 S/P TRANSCATHETER REPLACEMENT OF PULMONARY VALVE: Primary | ICD-10-CM

## 2017-11-01 DIAGNOSIS — I37.0 NONRHEUMATIC PULMONARY VALVE STENOSIS: ICD-10-CM

## 2017-11-01 DIAGNOSIS — Q20.0 TRUNCUS ARTERIOSUS: ICD-10-CM

## 2017-11-01 LAB — INTERPRETATION ECG - MUSE: NORMAL

## 2017-11-01 PROCEDURE — 99214 OFFICE O/P EST MOD 30 MIN: CPT | Mod: 25,ZF

## 2017-11-01 PROCEDURE — 93005 ELECTROCARDIOGRAM TRACING: CPT | Mod: ZF

## 2017-11-01 PROCEDURE — 93320 DOPPLER ECHO COMPLETE: CPT

## 2017-11-01 ASSESSMENT — PAIN SCALES - GENERAL: PAINLEVEL: NO PAIN (0)

## 2017-11-01 NOTE — NURSING NOTE
"Chief Complaint   Patient presents with     Heart Problem     Truncus arteriosus.       Initial /63 (BP Location: Right leg, Patient Position: Supine, Cuff Size: Adult Large)  Pulse 88  Resp 16  Ht 5' 11.1\" (180.6 cm)  Wt 181 lb 3.5 oz (82.2 kg)  SpO2 99%  BMI 25.2 kg/m2 Estimated body mass index is 25.2 kg/(m^2) as calculated from the following:    Height as of this encounter: 5' 11.1\" (180.6 cm).    Weight as of this encounter: 181 lb 3.5 oz (82.2 kg).  Medication Reconciliation: complete    Vitals:    11/01/17 1320 11/01/17 1321 11/01/17 1322 11/01/17 1323   BP: 117/73 117/55 112/50 130/63   BP Location: Right arm Right leg Right leg Right leg   Patient Position: Supine Supine Supine Supine   Cuff Size: Adult Large Adult Large Adult Large Adult Large   Pulse:  88 87 88   Resp:       SpO2:       Weight:       Height:              Ligia Lala M.A.    "

## 2017-11-01 NOTE — LETTER
2017      RE: Spike Silva  [address] [place] [address]                                                         Pediatric Cardiology Clinic Note    Patient:  Spike Silva MRN:  [id number]   YOB: 1995 Age:  22 year old   Date of Visit:  2017 PCP:  Attila Lombardo MD     Dear Attila Crawley MD:    I had the pleasure of seeing your patient Spike Silva at the [facility] for a consultation on 2017 for a consultation regarding cardiac catheterization and interventions on his stenotic conduit.     History of Present Illness:     Spike Silva is a 22 year old with     1. Truncus arteriosus  2. Status post initial pulmonary artery banding in the  period  3. Status post total repair with homograft and VSD patch closure in infancy by Dr. Deras to [facility]  4. Status post RV to PA conduit revision in  using the 21 mm Biocor St. Raymond bioprosthetic valve along with that Dacron patch augmentation of the homograft at [facility]  5. Recurrent conduit stenosis, severe  6. Severe RV hypertension.  7. status post cardiac catheterization on September 15, 2017 with stent placement in proximal right pulmonary artery, stent dilation of distal conduit followed by transcatheter pulmonary valve implantation using a 20 mm NICKOLAS valve on 18 mm balloon with an excellent result. Probably pressures decreased from 80 to 47 with improvement in cardiac output by 25%. There were no complications    He underwent cardiac catheterization by me 6 weeks ago. He has done well since then. He reports increase in his exercise capacity. His groin has healed well. There are no new concerns. He has not had any recent infections or fevers.    Past Medical History:     PMH/Birth Hx:  The past medical history was reviewed with the patient and family today and updated    Past  "surgical Hx: As above    No recent ER visits or hospitalizations. No history of asthma.   Immunizations UTD per parents.   He has a current medication list which includes the following prescription(s): aspirin, divalproex, cyproheptadine, albuterol, and isometheptene-dichloral-apap. Heis allergic to amoxicillin.      Family and Social History:     The family history was reviewed and updated today. No significant changes were noted.   Mom/Parents report that there is no family history of congenital heart disease, early/unexplained sudden deaths, persons needing pacemakers/defibrillators at a young age.    Mom/Parents report that there is no family history of WPW syndrome, Brugada syndrome, or long QT syndrome.         Review of Systems: A comprehensive review of systems was performed and is negative, except as noted in the HPI and PMH    Physical exam:  His height is 1.806 m (5' 11.1\") and weight is 82.2 kg (181 lb 3.5 oz). His blood pressure is 130/63 and his pulse is 88. His respiration is 16 and oxygen saturation is 99%.   His body mass index is 25.2 kg/(m^2).  His body surface area is 2.03 meters squared.  There is no central or peripheral cyanosis. Pupils are reactive and sclera are not jaundiced. There is no conjunctival injection or discharge. EOMI. Mucous membranes are moist and pink.   Lungs are clear to ausculation bilaterally with no wheezes, rales or rhonchi. There is no increased work of breathing, retractions or nasal flaring. Precordium is quiet with a normally placed apical impulse. On auscultation, heart sounds are regular with normal S1 and physiologically split S2. There is a thrill on palpation. There is a grade 1/6 low frequency ejection systolic murmur in the left upper sternal border radiating to the axillary. There is no diastolic component.  Abdomen is soft and non-tender without masses or hepatomegaly. Femoral pulses are normal with no brachial femoral delay.Skin is without rashes, lesions, " or significant bruising. Extremities are warm and well-perfused with no cyanosis, clubbing or edema. Peripheral pulses are normal and there is < 2 sec capillary refill. Patient is alert and oriented and moves all extremities equally with normal tone.     Extended Vitals not filed for this encounter.  Normalized stature-for-age data not available for patients older than 20 years.  Normalized weight-for-age data not available for patients older than 20 years.  Normalized BMI data available only for age 0 to 20 years.  No head circumference on file for this encounter.  Normalized stature-for-age data not available for patients older than 20 years.           Investigations and lab work:     12 Lead EKG performed recently was reviewed, it  shows normal sinus rhythm  heart rate of 89 with right bundle branch block.    An echocardiogram performed recently was reviewed by me. Post Op Truncus Arteriosis. Now post transcatheter Zarina valve implantation  in the pulmonary position, stenting of the right ventricle to pulmonary artery  conduit and stenting of the proximal right branch pulmonary artery.     Technically difficult study due to poor acoustic windows. There is no  prosthetic pulmonary valve insufficiency. The peak gradient across the  prosthetic pulmonary valve/ homograft is 35mHg.. There is unobstructed flow in  the right pulmonary artery. Estimated right ventricular systolic pressure is  52 mmHg plus right atrial pressure. The right ventricular systolic pressure is  half systemic. Mild right ventiruclar hypertrophy. Normal right ventricular  size. Low normal right ventricular systolic function. Normal left ventricular  systolic function. There is no truncal valve insufficiency.The aortic root is  dilated at the level of the sinuses of Valsalva. The ascending aorta is mildly  dilated. No pericardial effusion.      A cardiac MRI performed in 2015 was reviewed today by me along with the parents and the patient. It  showed  IMPRESSION:   1. Truncus arteriosus status post surgical repair.  2. Narrowing of the RV to pulmonary artery conduit (14mm) with respect  to the left pulmonary artery (21mm).  3. Truncal root measures 5.2 cm at the sinuses of Valsalva.  4. Right atrial and ventricular enlargement.  5. Borderline left ventricular enlargement.    A CT angiogram of the heart which was performed yesterday was reviewed by me and I showed it to the parents and the patient. It showed    1. Truncus arteriosus status post repair.   2. There is a PA conduit that is severely calcified. It measures 18 mm  x 18 mm at its origin/RV insertion, 19 mm x 16 mm in its mid segment,  17 mm x 8 mm at its narrowest segment just prior to the PA  bifurcation.  3. Single coronary artery with a posterior takeoff, that splits into  the LAD and a dominant circumflex artery that gives off the PDA. Given  the posterior takeoff, the left main or LAD are not in close proximity  to the PA conduit.  4. Moderate to severe enlargement of the aortic/truncus root,  measuring 5 cm x 4.9 cm at the sinuses of Valsalva.  5. Please see radiology report for incidental noncardiac findings.         Assessment and Plan:     In summary, Spike is a 22 year old with     1. Truncus arteriosus  2. Status post initial pulmonary artery banding in the  period  3. Status post total repair with homograft and VSD patch closure in infancy by Dr. Deras to [facility]  4. Status post RV to PA conduit revision in  using the 21 mm Biocor St. Raymond bioprosthetic valve along with that Dacron patch augmentation of the homograft at [facility]  5. Recurrent conduit stenosis, severe  6. Severe RV hypertension.  7. status post cardiac catheterization on September 15, 2017 with stent placement in proximal right pulmonary artery, stent dilation of distal conduit followed by transcatheter pulmonary valve implantation using a 20 mm NICKOLAS valve on 18 mm balloon  with an excellent result. Probably pressures decreased from 80 to 47 with improvement in cardiac output by 25%. There were no complications      I think Spike had an excellent result from extensive transcatheter interventions on his pulmonary outflow. He now has a new transcatheter pulmonary valve in place. Based on the echocardiogram today his right ventricular pressures are 40% of his blood pressure. He has no pulmonary valve insufficiency. His valve and stents are in good position. This is very reassuring.  Importantly, he is feeling much better.  Overall I am very pleased with these results. We do recommend that he continue to take his baby aspirin once a day for life. High continue to recommend endocarditis prophylaxis in him prior to dental work. He has no restrictions in activity.    I recommend follow-up with his primary cardiologist Dr. Galvez in 5 months from now. I recommend he get a 2 view chest x-ray, and echocardiogram and an EKG had that visit.      Thank you for the opportunity to participate in the care of Spike Silva . Please do not hesitate to call with questions or concerns.    Sincerely,      Hunter Matt MD, Merged with [facility]   of Pediatrics.  Pediatric interventional cardiologist.   [facility].   Email: [email or web address]      I, Hunter Matt, spent a total of 30 minutes face-to-face with the patient, Spike Silva. Over 50% of my time was spent counseling the patient and/or coordinating care regarding the diagnosis and its management.     CC  Patient Care Team:  Attila Lombardo MD as PCP - General (Pediatrics)  Matthew Galvez MD as MD (Pediatric Cardiology)

## 2017-11-01 NOTE — PROGRESS NOTES
Pediatric Cardiology Clinic Note    Patient:  Spike Silva MRN:  [id number]   YOB: 1995 Age:  22 year old   Date of Visit:  2017 PCP:  Attila Lombardo MD     Dear Attila Crawley MD:    I had the pleasure of seeing your patient Spike Silva at the [facility] for a consultation on 2017 for a consultation regarding cardiac catheterization and interventions on his stenotic conduit.     History of Present Illness:     Spike Silva is a 22 year old with     1. Truncus arteriosus  2. Status post initial pulmonary artery banding in the  period  3. Status post total repair with homograft and VSD patch closure in infancy by Dr. Deras to [facility]  4. Status post RV to PA conduit revision in  using the 21 mm Biocor St. Raymond bioprosthetic valve along with that Dacron patch augmentation of the homograft at [facility]  5. Recurrent conduit stenosis, severe  6. Severe RV hypertension.  7. status post cardiac catheterization on September 15, 2017 with stent placement in proximal right pulmonary artery, stent dilation of distal conduit followed by transcatheter pulmonary valve implantation using a 20 mm NICKOLAS valve on 18 mm balloon with an excellent result. Probably pressures decreased from 80 to 47 with improvement in cardiac output by 25%. There were no complications    He underwent cardiac catheterization by me 6 weeks ago. He has done well since then. He reports increase in his exercise capacity. His groin has healed well. There are no new concerns. He has not had any recent infections or fevers.    Past Medical History:     PMH/Birth Hx:  The past medical history was reviewed with the patient and family today and updated    Past surgical Hx: As above    No recent ER visits or hospitalizations. No history of asthma.  "  Immunizations UTD per parents.   He has a current medication list which includes the following prescription(s): aspirin, divalproex, cyproheptadine, albuterol, and isometheptene-dichloral-apap. Heis allergic to amoxicillin.      Family and Social History:     The family history was reviewed and updated today. No significant changes were noted.   Mom/Parents report that there is no family history of congenital heart disease, early/unexplained sudden deaths, persons needing pacemakers/defibrillators at a young age.    Mom/Parents report that there is no family history of WPW syndrome, Brugada syndrome, or long QT syndrome.         Review of Systems: A comprehensive review of systems was performed and is negative, except as noted in the HPI and PMH    Physical exam:  His height is 1.806 m (5' 11.1\") and weight is 82.2 kg (181 lb 3.5 oz). His blood pressure is 130/63 and his pulse is 88. His respiration is 16 and oxygen saturation is 99%.   His body mass index is 25.2 kg/(m^2).  His body surface area is 2.03 meters squared.  There is no central or peripheral cyanosis. Pupils are reactive and sclera are not jaundiced. There is no conjunctival injection or discharge. EOMI. Mucous membranes are moist and pink.   Lungs are clear to ausculation bilaterally with no wheezes, rales or rhonchi. There is no increased work of breathing, retractions or nasal flaring. Precordium is quiet with a normally placed apical impulse. On auscultation, heart sounds are regular with normal S1 and physiologically split S2. There is a thrill on palpation. There is a grade 1/6 low frequency ejection systolic murmur in the left upper sternal border radiating to the axillary. There is no diastolic component.  Abdomen is soft and non-tender without masses or hepatomegaly. Femoral pulses are normal with no brachial femoral delay.Skin is without rashes, lesions, or significant bruising. Extremities are warm and well-perfused with no cyanosis, " clubbing or edema. Peripheral pulses are normal and there is < 2 sec capillary refill. Patient is alert and oriented and moves all extremities equally with normal tone.     Extended Vitals not filed for this encounter.  Normalized stature-for-age data not available for patients older than 20 years.  Normalized weight-for-age data not available for patients older than 20 years.  Normalized BMI data available only for age 0 to 20 years.  No head circumference on file for this encounter.  Normalized stature-for-age data not available for patients older than 20 years.           Investigations and lab work:     12 Lead EKG performed recently was reviewed, it  shows normal sinus rhythm  heart rate of 89 with right bundle branch block.    An echocardiogram performed recently was reviewed by me. Post Op Truncus Arteriosis. Now post transcatheter Zarina valve implantation  in the pulmonary position, stenting of the right ventricle to pulmonary artery  conduit and stenting of the proximal right branch pulmonary artery.     Technically difficult study due to poor acoustic windows. There is no  prosthetic pulmonary valve insufficiency. The peak gradient across the  prosthetic pulmonary valve/ homograft is 35mHg.. There is unobstructed flow in  the right pulmonary artery. Estimated right ventricular systolic pressure is  52 mmHg plus right atrial pressure. The right ventricular systolic pressure is  half systemic. Mild right ventiruclar hypertrophy. Normal right ventricular  size. Low normal right ventricular systolic function. Normal left ventricular  systolic function. There is no truncal valve insufficiency.The aortic root is  dilated at the level of the sinuses of Valsalva. The ascending aorta is mildly  dilated. No pericardial effusion.      A cardiac MRI performed in 2015 was reviewed today by me along with the parents and the patient. It showed  IMPRESSION:   1. Truncus arteriosus status post surgical repair.  2.  Narrowing of the RV to pulmonary artery conduit (14mm) with respect  to the left pulmonary artery (21mm).  3. Truncal root measures 5.2 cm at the sinuses of Valsalva.  4. Right atrial and ventricular enlargement.  5. Borderline left ventricular enlargement.    A CT angiogram of the heart which was performed yesterday was reviewed by me and I showed it to the parents and the patient. It showed    1. Truncus arteriosus status post repair.   2. There is a PA conduit that is severely calcified. It measures 18 mm  x 18 mm at its origin/RV insertion, 19 mm x 16 mm in its mid segment,  17 mm x 8 mm at its narrowest segment just prior to the PA  bifurcation.  3. Single coronary artery with a posterior takeoff, that splits into  the LAD and a dominant circumflex artery that gives off the PDA. Given  the posterior takeoff, the left main or LAD are not in close proximity  to the PA conduit.  4. Moderate to severe enlargement of the aortic/truncus root,  measuring 5 cm x 4.9 cm at the sinuses of Valsalva.  5. Please see radiology report for incidental noncardiac findings.         Assessment and Plan:     In summary, Spike is a 22 year old with     1. Truncus arteriosus  2. Status post initial pulmonary artery banding in the  period  3. Status post total repair with homograft and VSD patch closure in infancy by Dr. Deras to [facility]  4. Status post RV to PA conduit revision in  using the 21 mm Biocor St. Raymond bioprosthetic valve along with that Dacron patch augmentation of the homograft at [facility]  5. Recurrent conduit stenosis, severe  6. Severe RV hypertension.  7. status post cardiac catheterization on September 15, 2017 with stent placement in proximal right pulmonary artery, stent dilation of distal conduit followed by transcatheter pulmonary valve implantation using a 20 mm NICKOLAS valve on 18 mm balloon with an excellent result. Probably pressures decreased from 80 to 47 with  improvement in cardiac output by 25%. There were no complications      I think Spike had an excellent result from extensive transcatheter interventions on his pulmonary outflow. He now has a new transcatheter pulmonary valve in place. Based on the echocardiogram today his right ventricular pressures are 40% of his blood pressure. He has no pulmonary valve insufficiency. His valve and stents are in good position. This is very reassuring.  Importantly, he is feeling much better.  Overall I am very pleased with these results. We do recommend that he continue to take his baby aspirin once a day for life. High continue to recommend endocarditis prophylaxis in him prior to dental work. He has no restrictions in activity.    I recommend follow-up with his primary cardiologist Dr. Diamond in 5 months from now. I recommend he get a 2 view chest x-ray, and echocardiogram and an EKG had that visit.      Thank you for the opportunity to participate in the care of Spike Silva . Please do not hesitate to call with questions or concerns.    Sincerely,      Hunter Matt MD, [facility]   of Pediatrics.  Pediatric interventional cardiologist.   [facility].   Email: [email or web address]      I, Hunter Matt, spent a total of 30 minutes face-to-face with the patient, Spike Silva. Over 50% of my time was spent counseling the patient and/or coordinating care regarding the diagnosis and its management.       CC:    1. Attila Lombardo    2.  CC  Patient Care Team:  Attila Lombardo MD as PCP - General (Pediatrics)  Darren Diamond MD as MD (Pediatric Cardiology)  DARREN DIAMOND

## 2017-11-01 NOTE — MR AVS SNAPSHOT
After Visit Summary   11/1/2017    Spike Silva    MRN: [id number]           Patient Information     Date Of Birth          1995        Visit Information        Provider Department      11/1/2017 1:00 PM Hunter Steward MD Peds Cardiology        Today's Diagnoses     S/P transcatheter replacement of pulmonary valve    -  1    Truncus arteriosus        Nonrheumatic pulmonary valve stenosis          Care Instructions      PEDS CARDIOLOGY  [facility]  [address] [facility] [address] [phone]      Cardiology Clinic  [phone]  RN Care Coordinator, Carolina Leija (Bre)  [phone]  Pediatric Call Center/Scheduling  [phone]    After Hours and Emergency Contact Number  [phone]  * Ask for the pediatric cardiologist on call         Prescription Renewals  The pharmacy must fax requests to [phone]  * Please allow 3-4 days for prescriptions to be authorized               Follow-ups after your visit        Follow-up notes from your care team     Return in about 5 months (around 4/1/2018) for echo, EKG, chest x ray 2 views.      Who to contact     Please call your clinic at [phone] to:    Ask questions about your health    Make or cancel appointments    Discuss your medicines    Learn about your test results    Speak to your doctor   If you have compliments or concerns about an experience at your clinic, or if you wish to file a complaint, please contact [facility] Patient Relations at [phone] or email us at [email or web address]         Additional Information About Your Visit        [employer] Information     dotHIV is an electronic gateway that provides easy, online access to your medical records. With dotHIV, you can request a clinic appointment, read your test results, renew a prescription or communicate with your care team.     To sign up for dotHIV visit the  "website at [email or web address]   You will be asked to enter the access code listed below, as well as some personal information. Please follow the directions to create your username and password.     Your access code is: [email or web address]  Expires: 2018  2:37 PM     Your access code will  in 90 days. If you need help or a new code, please contact your [facility] or call [phone] for assistance.        Care EveryWhere ID     This is your Care EveryWhere ID. This could be used by other organizations to access your [place] medical records  [id number]        Your Vitals Were     Pulse Respirations Height Pulse Oximetry BMI (Body Mass Index)       88 16 1.806 m (5' 11.1\") 99% 25.2 kg/m2        Blood Pressure from Last 3 Encounters:   17 130/63   17 102/60   17 114/72    Weight from Last 3 Encounters:   17 82.2 kg (181 lb 3.5 oz)   09/15/17 82.6 kg (182 lb 1.6 oz)   17 81 kg (178 lb 9.2 oz) (79 %)*     * Growth percentiles are based on CDC 2-20 Years data.              We Performed the Following     Echo Pediatric Congenital     EKG 12 lead - pediatric        Primary Care Provider Office Phone # Fax #    Attila Lombardo [phone]       [facility] [address] [address]        Equal Access to Services     NAOMIE SHAIKH : Hadii aad ku hadasho Soomaali, waaxda luqadaha, qaybta kaalmada adeegyada, waxay mukul louie . So [facility] [phone].    ATENCIÓN: Si habla español, tiene a stevens disposición servicios gratuitos de asistencia lingüística. Llame al [phone].    We comply with applicable federal civil rights laws and [place] laws. We do not discriminate on the basis of race, color, national origin, age, disability, sex, sexual orientation, or gender identity.            Thank you!     Thank you for choosing PEDS CARDIOLOGY  for your care. Our goal is always to provide you " with excellent care. Hearing back from our patients is one way we can continue to improve our services. Please take a few minutes to complete the written survey that you may receive in the mail after your visit with us. Thank you!             Your Updated Medication List - Protect others around you: Learn how to safely use, store and throw away your medicines at www.disposemymeds.org.          This list is accurate as of: 11/1/17  2:37 PM.  Always use your most recent med list.                   Brand Name Dispense Instructions for use Diagnosis    albuterol (2.5 MG/3ML) 0.083% neb solution      Take 1 vial by nebulization every 6 hours as needed for shortness of breath / dyspnea or wheezing        aspirin 81 MG EC tablet     30 tablet    Take 1 tablet (81 mg) by mouth daily    History of artificial heart valve       cyproheptadine 4 MG tablet    PERIACTIN     Take 6 mg by mouth 2 times daily 1.5 tabs twice daily        divalproex 500 MG 24 hr tablet    DEPAKOTE ER     Take 1,000 mg by mouth daily        MIDRIN PO      Take 1 tablet by mouth at onset of headache May take 2 tabs PRN

## 2018-03-12 DIAGNOSIS — Q20.0 TRUNCUS ARTERIOSUS: ICD-10-CM

## 2018-03-12 DIAGNOSIS — Z95.2 HISTORY OF ARTIFICIAL HEART VALVE: Primary | ICD-10-CM

## 2018-04-11 ENCOUNTER — RECORDS - HEALTHEAST (OUTPATIENT)
Dept: GENERAL RADIOLOGY | Facility: CLINIC | Age: 23
End: 2018-04-11

## 2018-04-11 ENCOUNTER — OFFICE VISIT (OUTPATIENT)
Dept: PEDIATRIC CARDIOLOGY | Facility: CLINIC | Age: 23
End: 2018-04-11
Payer: COMMERCIAL

## 2018-04-11 ENCOUNTER — RADIANT APPOINTMENT (OUTPATIENT)
Dept: CARDIOLOGY | Facility: CLINIC | Age: 23
End: 2018-04-11
Payer: COMMERCIAL

## 2018-04-11 ENCOUNTER — RECORDS - HEALTHEAST (OUTPATIENT)
Dept: ADMINISTRATIVE | Facility: OTHER | Age: 23
End: 2018-04-11

## 2018-04-11 VITALS
RESPIRATION RATE: 16 BRPM | OXYGEN SATURATION: 97 % | HEART RATE: 87 BPM | SYSTOLIC BLOOD PRESSURE: 120 MMHG | DIASTOLIC BLOOD PRESSURE: 65 MMHG

## 2018-04-11 DIAGNOSIS — Z95.2 PRESENCE OF PROSTHETIC HEART VALVE: ICD-10-CM

## 2018-04-11 DIAGNOSIS — Q20.0 TRUNCUS ARTERIOSUS: ICD-10-CM

## 2018-04-11 DIAGNOSIS — Q20.0 COMMON ARTERIAL TRUNK: ICD-10-CM

## 2018-04-11 DIAGNOSIS — Z95.2 HISTORY OF ARTIFICIAL HEART VALVE: Primary | ICD-10-CM

## 2018-04-11 DIAGNOSIS — Z95.2 HISTORY OF ARTIFICIAL HEART VALVE: ICD-10-CM

## 2018-04-11 LAB — INTERPRETATION ECG - MUSE: NORMAL

## 2018-04-11 ASSESSMENT — PAIN SCALES - GENERAL: PAINLEVEL: NO PAIN (0)

## 2018-04-11 NOTE — LETTER
4/11/2018      RE: Spike Silva  [address] [place] [address]       Pediatric Cardiology Visit    Patient:  Spike Silva MRN:  [id number]   YOB: 1995 Age:  22 year old   Date of Visit:  4/11/2018 PCP:  Attila Lombardo MD     Dear Dr. Lombardo:    I had the pleasure of seeing Spike Silva at the [facility] in [place] on 4/11/2018 in ongoing consultation for truncus arteriosus. As you know, he is a 22 year old male with truncus arteriosus, s/p initial PA banding, subsequent complete repair with RV-PA conduit and VSD repair, now s/p PVR with a 21mm porcine Biocor valve in 2008 with Dr. Sexton. I last saw him in 6/2017, and at that visit I was concerned for progressive RV-PA conduit stenosis, and arranged for a CT angiogram to better evaluate this; which showed significant obstruction. Spike met with my colleague Dr. Matt, and subsequently underwent cardiac catheterization, which I will briefly summarize here. Baseline hemodynamics showed RVP 82/12mmHg, conduit 37/12mmHg, LPA 29/11mmHg, RPA 19/11mmHg. The Biocor pulmonary valve was dilated with an 18mm MARC balloon, followed by balloon angioplasty and placement of a [id number] LD Max stent on 16mm BiB in the proximal RPA. He then had balloon dilation of the distal conduit, pre-stenting, and implantation of a Zarina TPV 20. Right heart pressures improved by half, with a 25% increase in cardiac output. Uncomplicated cath course; discharged post-procedure day #1. In the interval since then, he has been healthy. He had a post-cath check with Dr. Matt in 11/2017. No new concerns today.    Past medical history:   Past Medical History:   Diagnosis Date     Migraine headache      PONV (postoperative nausea and vomiting)      Truncus arteriosus 7/25/2012    RV hypertension, recurrent conduit stenosis    As above. I reviewed Spike Silva's medical  "records.    He has a current medication list which includes the following prescription(s): albuterol, isometheptene-dichloral-apap, aspirin, cyproheptadine, and divalproex sodium extended-release. He is allergic to amoxicillin.    Family and Social History:  unchanged    The Review of Systems is negative other than noted in the HPI.    Physical Examination:  /65 (BP Location: Right arm, Patient Position: Sitting, Cuff Size: Adult Large)  Pulse 87  Resp 16  Ht (P) 5' 10.87\" (180 cm)  Wt (P) 188 lb 7.9 oz (85.5 kg)  SpO2 97%  BMI (P) 26.39 kg/m2  GENERAL: Pleasant and conversant, non-distressed  SKIN: Clear, no rash or abnormal pigmentation  HEAD: NC/AT, nondysmorphic  NECK: Supple without lymphadenopathy or thyromegaly  LUNGS: CTAB, normal symmetric air entry, normal WOB, no rales/rhonchi/wheezes  HEART: Quiet precordium, RRR, normal S1/S2, 2/6 JEFFREY along the LUSB, nonradiating, quiet in diastole, no r/g  ABDOMEN: Soft, NT/ND, normoactive BS, no HSM  EXTREMITIES: W/WP, no c/c/e, pulses 2+ throughout without radio-femoral delay  GENITOURINARY: deferred    I reviewed and interpreted Spike's ECG from today, which showed normal sinus rhythm, normal axes and RBBB, no preexcitation, normal ST-T waves, and normal voltages (possible LAE).   I reviewed his echo from today, which showed normal RV size and mild RVH with normal systolic function. Mild gradient across the Zarina valve, peak 25mmHg, no insufficiency. No RPA acceleration across the RPA stent. RVSP 56mmHg+RAP = ~1/2-systemic. Normal LV size and function. Mild truncal valve flow acceleration to peak 13mmHg, no insufficiency. Dilated truncal root, unchanged. No effusion.  I reviewed his CXR from today at [facility], which showed the Zarina and RPA stents in stable position with no visible stent fracture.    Assessment and Plan: Spike is a 22 year old male with truncus arteriosus s/p repair with an RV-PA conduit, subsequent conduit replacement with a 21mm " Biocor valve, now s/p RPA stent and transcatheter 20mm Zarina valve in the conduit, with good results. I discussed findings today with Spike. I will continue the recommendation from Dr. Matt in re: aspirin prophylaxis related to the Zarina valve. He will follow-up in 1 year with an ECG and echocardiogram. We will continue to discuss eventual transition to adult congenital cardiac care. He has no activity restrictions. Yes antibiotic prophylaxis required for invasive procedures.    Thank you for the opportunity to follow Spike with you. Please don't hesitate to contact me with questions or concerns.    Matthew Galvez MD  Pediatric Cardiology  [facility] [address], 5th floor, [facility] [address]  Phone [phone]  Fax [phone]

## 2018-04-11 NOTE — MR AVS SNAPSHOT
After Visit Summary   2018    Spike Silva    MRN: [id number]           Patient Information     Date Of Birth          1995        Visit Information        Provider Department      2018 3:30 PM Matthew Galvez MD [facility]        Today's Diagnoses     History of artificial heart valve    -  1    Truncus arteriosus          Care Instructions    [facility]  Pediatric Specialty Clinic [place]      Pediatric Call Center [phone], option 1  Maggie Montiel RN Care Coordinator:  [phone]    After Hours Emergency:  [phone].  Ask for the on-call pediatric doctor for the specialty you are calling for be paged.    Prescription Renewals:  Your pharmacy must fax requests to [phone].  Please allow 2-3 days for prescriptions to be authorized.    If your physician has ordered an CT or MRI, you may schedule this test by calling [facility] in [place] at [phone].            Follow-ups after your visit        Follow-up notes from your care team     Return in about 1 year (around 2019).      Who to contact     Please call your clinic at [phone] to:    Ask questions about your health    Make or cancel appointments    Discuss your medicines    Learn about your test results    Speak to your doctor            Additional Information About Your Visit        MyChart Information     Echolocationt is an electronic gateway that provides easy, online access to your medical records. With [employer], you can request a clinic appointment, read your test results, renew a prescription or communicate with your care team.     To sign up for [employer] visit the website at [email or web address]   You will be asked to enter the access code listed below, as well as some personal information. Please follow the directions to create your username and password.     Your access code is:  [email or web address]  Expires: 7/10/2018  2:09 PM     Your access code will  in 90 days. If you need help or a new code, please contact your [facility] or call [phone] for assistance.        Care EveryWhere ID     This is your Care EveryWhere ID. This could be used by other organizations to access your [place] medical records  [id number]        Your Vitals Were     Pulse Respirations Pulse Oximetry             87 16 97%          Blood Pressure from Last 3 Encounters:   18 120/65   17 130/63   17 102/60    Weight from Last 3 Encounters:   18 (P) 188 lb 7.9 oz (85.5 kg)   17 181 lb 3.5 oz (82.2 kg)   09/15/17 182 lb 1.6 oz (82.6 kg)              We Performed the Following     EKG 12-lead complete w/read - Same Day        Primary Care Provider Office Phone # Fax #    Attila Lombardo [phone]       [facility] [address] [address]        Equal Access to Services     DONAVAN [facility] HospitalFREDY AH: Hadii aad ku hadasho Soomaali, waaxda luqadaha, qaybta kaalmada ademadieyada, isabel louie . So [facility] [phone].    ATENCIÓN: Si habla español, tiene a stevens disposición servicios gratuitos de asistencia lingüística. [facility] [phone].    We comply with applicable federal civil rights laws and [place] laws. We do not discriminate on the basis of race, color, national origin, age, disability, sex, sexual orientation, or gender identity.            Thank you!     Thank you for choosing [facility] PEDIATRIC SPECIALTY CLINIC  for your care. Our goal is always to provide you with excellent care. Hearing back from our patients is one way we can continue to improve our services. Please take a few minutes to complete the written survey that you may receive in the mail after your visit with us. Thank you!             Your Updated Medication List - Protect others around you: Learn how to safely  use, store and throw away your medicines at www.disposemymeds.org.          This list is accurate as of 4/11/18 11:59 PM.  Always use your most recent med list.                   Brand Name Dispense Instructions for use Diagnosis    albuterol (2.5 MG/3ML) 0.083% neb solution      Take 1 vial by nebulization every 6 hours as needed for shortness of breath / dyspnea or wheezing        aspirin 81 MG EC tablet     30 tablet    Take 1 tablet (81 mg) by mouth daily    History of artificial heart valve       cyproheptadine 4 MG tablet    PERIACTIN     Take 6 mg by mouth 2 times daily 1.5 tabs twice daily        divalproex sodium extended-release 500 MG 24 hr tablet    DEPAKOTE ER     Take 500 mg by mouth daily        MIDRIN PO      Take 1 tablet by mouth at onset of headache May take 2 tabs PRN

## 2018-04-11 NOTE — NURSING NOTE
"Chief Complaint   Patient presents with     Heart Problem     Follow-up on Pulmonary Stent and Ballooning.       Initial /65 (BP Location: Right arm, Patient Position: Sitting, Cuff Size: Adult Large)  Pulse 87  Resp 16  Ht (P) 5' 10.87\" (180 cm)  Wt (P) 188 lb 7.9 oz (85.5 kg)  SpO2 97%  BMI (P) 26.39 kg/m2 Estimated body mass index is 26.39 kg/(m^2) (pended) as calculated from the following:    Height as of this encounter: (P) 5' 10.87\" (180 cm).    Weight as of this encounter: (P) 188 lb 7.9 oz (85.5 kg).  Medication Reconciliation: complete  "

## 2018-04-11 NOTE — PATIENT INSTRUCTIONS
[facility]  Pediatric Specialty Clinic [place]      Pediatric Call Center [phone], option 1  Maggie Montiel RN Care Coordinator:  [phone]    After Hours Emergency:  [phone].  Ask for the on-call pediatric doctor for the specialty you are calling for be paged.    Prescription Renewals:  Your pharmacy must fax requests to [phone].  Please allow 2-3 days for prescriptions to be authorized.    If your physician has ordered an CT or MRI, you may schedule this test by calling [facility] Radiology in [place] at [phone].

## 2018-05-01 NOTE — PROGRESS NOTES
Pediatric Cardiology Visit    Patient:  Spike Silva MRN:  [id number]   YOB: 1995 Age:  22 year old   Date of Visit:  4/11/2018 PCP:  Attila Lombardo MD     Dear Dr. Lombardo:    I had the pleasure of seeing Spike Silva at the [facility] in [place] on 4/11/2018 in ongoing consultation for truncus arteriosus. As you know, he is a 22 year old male with truncus arteriosus, s/p initial PA banding, subsequent complete repair with RV-PA conduit and VSD repair, now s/p PVR with a 21mm porcine Biocor valve in 2008 with Dr. Sexton. I last saw him in 6/2017, and at that visit I was concerned for progressive RV-PA conduit stenosis, and arranged for a CT angiogram to better evaluate this; which showed significant obstruction. Spike met with my colleague Dr. Matt, and subsequently underwent cardiac catheterization, which I will briefly summarize here. Baseline hemodynamics showed RVP 82/12mmHg, conduit 37/12mmHg, LPA 29/11mmHg, RPA 19/11mmHg. The Biocor pulmonary valve was dilated with an 18mm MARC balloon, followed by balloon angioplasty and placement of a [id number] LD Max stent on 16mm BiB in the proximal RPA. He then had balloon dilation of the distal conduit, pre-stenting, and implantation of a Zarina TPV 20. Right heart pressures improved by half, with a 25% increase in cardiac output. Uncomplicated cath course; discharged post-procedure day #1. In the interval since then, he has been healthy. He had a post-cath check with Dr. Matt in 11/2017. No new concerns today.    Past medical history:   Past Medical History:   Diagnosis Date     Migraine headache      PONV (postoperative nausea and vomiting)      Truncus arteriosus 7/25/2012    RV hypertension, recurrent conduit stenosis    As above. I reviewed Spike Silva's medical records.    He has a current medication list which includes the following prescription(s):  "albuterol, isometheptene-dichloral-apap, aspirin, cyproheptadine, and divalproex sodium extended-release. He is allergic to amoxicillin.    Family and Social History:  unchanged    The Review of Systems is negative other than noted in the HPI.    Physical Examination:  /65 (BP Location: Right arm, Patient Position: Sitting, Cuff Size: Adult Large)  Pulse 87  Resp 16  Ht (P) 5' 10.87\" (180 cm)  Wt (P) 188 lb 7.9 oz (85.5 kg)  SpO2 97%  BMI (P) 26.39 kg/m2  GENERAL: Pleasant and conversant, non-distressed  SKIN: Clear, no rash or abnormal pigmentation  HEAD: NC/AT, nondysmorphic  NECK: Supple without lymphadenopathy or thyromegaly  LUNGS: CTAB, normal symmetric air entry, normal WOB, no rales/rhonchi/wheezes  HEART: Quiet precordium, RRR, normal S1/S2, 2/6 JEFFREY along the LUSB, nonradiating, quiet in diastole, no r/g  ABDOMEN: Soft, NT/ND, normoactive BS, no HSM  EXTREMITIES: W/WP, no c/c/e, pulses 2+ throughout without radio-femoral delay  GENITOURINARY: deferred    I reviewed and interpreted Spike's ECG from today, which showed normal sinus rhythm, normal axes and RBBB, no preexcitation, normal ST-T waves, and normal voltages (possible LAE).   I reviewed his echo from today, which showed normal RV size and mild RVH with normal systolic function. Mild gradient across the Zarina valve, peak 25mmHg, no insufficiency. No RPA acceleration across the RPA stent. RVSP 56mmHg+RAP = ~1/2-systemic. Normal LV size and function. Mild truncal valve flow acceleration to peak 13mmHg, no insufficiency. Dilated truncal root, unchanged. No effusion.  I reviewed his CXR from today at [facility], which showed the Zarina and RPA stents in stable position with no visible stent fracture.    Assessment and Plan: Spike is a 22 year old male with truncus arteriosus s/p repair with an RV-PA conduit, subsequent conduit replacement with a 21mm Biocor valve, now s/p RPA stent and transcatheter 20mm Zarina valve in the conduit, with good " results. I discussed findings today with Spike. I will continue the recommendation from Dr. Matt in re: aspirin prophylaxis related to the Zarina valve. He will follow-up in 1 year with an ECG and echocardiogram. We will continue to discuss eventual transition to adult congenital cardiac care. He has no activity restrictions. Yes antibiotic prophylaxis required for invasive procedures.    Thank you for the opportunity to follow Spike with you. Please don't hesitate to contact me with questions or concerns.    Matthew Galvez MD  Pediatric Cardiology  [facility] [address], 5th floor, [facility] [address]  Phone [phone]  Fax [phone]

## 2018-11-28 DIAGNOSIS — Z95.2 HISTORY OF ARTIFICIAL HEART VALVE: ICD-10-CM

## 2018-11-28 DIAGNOSIS — Q20.0 TRUNCUS ARTERIOSUS: Primary | ICD-10-CM

## 2019-03-04 NOTE — TELEPHONE ENCOUNTER
Contacted home phone number to discuss possible dates for heart catheterization. Awaiting phone call back    Pupils equal, round and reactive to light, Extra-ocular movement intact, eyes are clear b/l

## 2019-04-10 ENCOUNTER — RECORDS - HEALTHEAST (OUTPATIENT)
Dept: ADMINISTRATIVE | Facility: OTHER | Age: 24
End: 2019-04-10

## 2019-07-09 ENCOUNTER — TELEPHONE (OUTPATIENT)
Dept: EMERGENCY MEDICINE | Facility: CLINIC | Age: 24
End: 2019-07-09

## 2019-07-09 NOTE — TELEPHONE ENCOUNTER
I have attempted to reach the patient. His voicemail is full and can't accept further voicemail.   He needs to be evaluated before we can offer that letter and clearance.

## 2019-07-09 NOTE — TELEPHONE ENCOUNTER
Patient states it is the exact same thing he has been doing but with a different department - .     He is transferring to [employer]. He already completed the physical testing they required.     Huddled with provider. We will give him a letter to cover him until his visit. I have advised the patient of this and faxed the letter and other forms as requested.     Marichuy Man, THUYN, RN

## 2019-07-09 NOTE — TELEPHONE ENCOUNTER
----- Message from Valerie Horner sent at 7/8/2019  3:38 PM CDT -----  Regarding: letter and results  Callers Name: Mario  Callers Phone Number: [phone]  Relationship to Patient:self  Best time of day to call: any  Is it ok to leave a detailed voicemail on this number: yes  Reason for Call: Mario is starting a new Job... Job required a physical and they need a letter stateing that mario is clear to work. They also need the cardo progress notes from last visit and also the cardio gram results as well. Mario would like a call back to make sure this can happen this week as he wants to start the job on Monday. Fax info to [phone]    Call mario as he said he would like conformation that it was done also said he can come in and sign a release form if needed.

## 2019-07-09 NOTE — TELEPHONE ENCOUNTER
Is an  Needed: no  If yes, Which Language:    Callers Name: Spike  Callers Phone Number: [phone]  Relationship to Patient: self  Best time of day to call: any  Is it ok to leave a detailed voicemail on this number: yes  Reason for Call: Spike called back to speak with you. I told him that he needed to be evaluated but he still wanted to speak with you.

## 2019-07-09 NOTE — LETTER
7/9/2019      RE: Spike Silva  [address] [place] [address]       To Whom It May Concern;    The above candidate is a patient of our cardiac clinic. We see him annually. We will see him again in August 2019. As of the clinic, he is cleared with no restrictions. You will find the letter and EKG from his last visit attached. If there are questions, please call the clinic at [phone].    Matthew Galvez MD

## 2019-08-14 ENCOUNTER — ANCILLARY PROCEDURE (OUTPATIENT)
Dept: CARDIOLOGY | Facility: CLINIC | Age: 24
End: 2019-08-14
Payer: COMMERCIAL

## 2019-08-14 ENCOUNTER — OFFICE VISIT (OUTPATIENT)
Dept: PEDIATRIC CARDIOLOGY | Facility: CLINIC | Age: 24
End: 2019-08-14
Payer: COMMERCIAL

## 2019-08-14 ENCOUNTER — RECORDS - HEALTHEAST (OUTPATIENT)
Dept: ADMINISTRATIVE | Facility: OTHER | Age: 24
End: 2019-08-14

## 2019-08-14 VITALS
WEIGHT: 171.74 LBS | HEIGHT: 71 IN | BODY MASS INDEX: 24.04 KG/M2 | HEART RATE: 93 BPM | DIASTOLIC BLOOD PRESSURE: 72 MMHG | SYSTOLIC BLOOD PRESSURE: 123 MMHG

## 2019-08-14 DIAGNOSIS — Z95.2 HISTORY OF ARTIFICIAL HEART VALVE: ICD-10-CM

## 2019-08-14 DIAGNOSIS — Q20.0 TRUNCUS ARTERIOSUS: ICD-10-CM

## 2019-08-14 DIAGNOSIS — Q20.0 TRUNCUS ARTERIOSUS: Primary | ICD-10-CM

## 2019-08-14 LAB — INTERPRETATION ECG - MUSE: NORMAL

## 2019-08-14 ASSESSMENT — MIFFLIN-ST. JEOR: SCORE: 1806.51

## 2019-08-14 ASSESSMENT — PAIN SCALES - GENERAL: PAINLEVEL: NO PAIN (0)

## 2019-08-14 NOTE — NURSING NOTE
"[facility] [[id number]]  Chief Complaint   Patient presents with     Heart Problem     Follow-up on Truncus Repair.     Initial /72 (BP Location: Right arm, Patient Position: Sitting, Cuff Size: Adult Large)   Pulse 93   Ht 1.804 m (5' 11.02\")   Wt 77.9 kg (171 lb 11.8 oz)   BMI 23.94 kg/m   Estimated body mass index is 23.94 kg/m  as calculated from the following:    Height as of this encounter: 1.804 m (5' 11.02\").    Weight as of this encounter: 77.9 kg (171 lb 11.8 oz).  Medication Reconciliation: complete    "

## 2019-08-14 NOTE — LETTER
8/14/2019      RE: Spike Silva  [address] [place] [address]       Pediatric Cardiology Visit    Patient:  Spike Silva MRN:  [id number]   YOB: 1995 Age:  24 year old   Date of Visit:  8/14/2019 PCP:  Attila Lombardo MD     Dear Dr. Lombardo:    I had the pleasure of seeing Spike Silva at the [facility] in [place] on 8/14/2019 in ongoing consultation for truncus arteriosus. As you know, he is a 24 year old male with truncus arteriosus, s/p initial PA banding, subsequent complete repair with RV-PA conduit and VSD repair, now s/p PVR with a 21mm porcine Biocor valve in 2008 with Dr. Sexton. In 2017 I was concerned for progressive RV-PA conduit stenosis, and arranged for a CT angiogram to better evaluate this, which showed significant obstruction. He underwent cardiac catheterization with my colleague Dr. Matt; the Biocor pulmonary valve was dilated with an 18mm MARC balloon, followed by balloon angioplasty and placement of a [id number] LD Max stent on 16mm BiB in the proximal RPA. He then had balloon dilation of the distal conduit, pre-stenting, and implantation of a Zarina TPV 20. I last saw him in 4/2018, and in the interval since then he has been healthy. No complaints of/perceived chest pain, dyspnea, palpitation, syncope/pre-syncope, easy fatigability. Easily keeps up with peers.    Past medical history:   Past Medical History:   Diagnosis Date     Migraine headache      PONV (postoperative nausea and vomiting)      Truncus arteriosus 7/25/2012    RV hypertension, recurrent conduit stenosis    As above. I reviewed Spike Silva's medical records.    He has a current medication list which includes the following prescription(s): albuterol, aspirin, and cyproheptadine. He is allergic to amoxicillin.    Family and Social History:  unchanged    The Review of Systems is negative other than  "noted in the HPI.    Physical Examination:  /72 (BP Location: Right arm, Patient Position: Sitting, Cuff Size: Adult Large)   Pulse 93   Ht 1.804 m (5' 11.02\")   Wt 77.9 kg (171 lb 11.8 oz)   BMI 23.94 kg/m     GENERAL: Pleasant and conversant, non-distressed  SKIN: Clear, no rash or abnormal pigmentation  HEAD: NC/AT, nondysmorphic  NECK: Supple without lymphadenopathy or thyromegaly  LUNGS: CTAB, normal symmetric air entry, normal WOB, no rales/rhonchi/wheezes  HEART: Quiet precordium, RRR, normal S1/S2, 2/6 JEFFREY along the LUSB, nonradiating, quiet in diastole, no r/g  ABDOMEN: Soft, NT/ND, normoactive BS, no HSM  EXTREMITIES: W/WP, no c/c/e, pulses 2+ throughout without radio-femoral delay  GENITOURINARY: deferred    I reviewed and interpreted Spike's ECG from today, which showed normal sinus rhythm, normal axes and RBBB (QRSduration  146msec), no preexcitation, normal ST-T waves, and normal voltages (?left atrial enlargement).   I reviewed his echo from today, which showed normal RV size and mild RVH with normal systolic function. Mild gradient across the Zarina valve, peak 40mmHg, no insufficiency. No RPA acceleration across the RPA stent. RVSP 64mmHg+RAP. Normal LV size and function. Mild truncal valve flow acceleration to peak 20mmHg, no insufficiency. Dilated truncal root, unchanged. No effusion.    Assessment and Plan: Spike is a 24 year old male with truncus arteriosus . I discussed findings today with s/p repair with an RV-PA conduit, subsequent conduit replacement with a 21mm Biocor valve, now s/p RPA stent and transcatheter 20mm Zarina valve in the conduit, with good results. I hope to get a number of years out of this transcatheter pulmonary valve prior to further intervention. He will follow-up in 1 year with an ECG and echocardiogram; we will again discuss the right timing for transfer to adult congenital cardiology at that visit. He should continue aspirin indefinitely given the Zarina " valve. He has no activity restrictions. Yes antibiotic prophylaxis required for invasive procedures.    Thank you for the opportunity to follow Spike with you. Please don't hesitate to contact me with questions or concerns.    Matthew Galvez MD  Pediatric Cardiology  [facility], 5th floor, [facility] [address]  Phone [phone]  Fax [phone]

## 2019-08-14 NOTE — LETTER
Return to  Work Release    Date: 8/14/2019      Name: Spike Silva                       YOB: 1995    Medical Record Number: [id number]    The patient was seen at: [facility] [place] [facility]    Restrictions if any: none    Resume Activity: With no limitations.        _________________________  Matthew Galvez MD

## 2019-08-14 NOTE — PATIENT INSTRUCTIONS
[facility]  Pediatric Specialty Clinic [place]      Pediatric Call Center [phone], option 1  Maggie Montiel RN Care Coordinator:  [phone]    After Hours Needing Immediate Care:  [phone].  Ask for the on-call pediatric doctor for the specialty you are calling for be paged.  For dermatology urgent matters that cannot wait until the next business day, is over a holiday and/or a weekend please call [phone] and ask for the Dermatology Resident On-Call to be paged.    Prescription Renewals:  Please call your pharmacy first.  Your pharmacy must fax requests to [phone].  Please allow 2-3 days for prescriptions to be authorized.    If your physician has ordered a CT or MRI, you may schedule this test by calling [facility] Radiology in [place] at [phone].    **If your child is having a sedated procedure, they will need a history and physical done at their Primary Care Provider within 30 days of the procedure.  If your child was seen by the ordering provider in our office within 30 days of the procedure, their visit summary will work for the H&P unless they inform you otherwise.  If you have any questions, please call the RN Care Coordinator.**

## 2019-09-13 NOTE — PROGRESS NOTES
Pediatric Cardiology Visit    Patient:  Spike Silva MRN:  [id number]   YOB: 1995 Age:  24 year old   Date of Visit:  8/14/2019 PCP:  Attila Lombardo MD     Dear Dr. Lombardo:    I had the pleasure of seeing Spike Silva at the [facility] in [place] on 8/14/2019 in ongoing consultation for truncus arteriosus. As you know, he is a 24 year old male with truncus arteriosus, s/p initial PA banding, subsequent complete repair with RV-PA conduit and VSD repair, now s/p PVR with a 21mm porcine Biocor valve in 2008 with Dr. Sexton. In 2017 I was concerned for progressive RV-PA conduit stenosis, and arranged for a CT angiogram to better evaluate this, which showed significant obstruction. He underwent cardiac catheterization with my colleague Dr. Matt; the Biocor pulmonary valve was dilated with an 18mm MARC balloon, followed by balloon angioplasty and placement of a [id number] LD Max stent on 16mm BiB in the proximal RPA. He then had balloon dilation of the distal conduit, pre-stenting, and implantation of a Zarina TPV 20. I last saw him in 4/2018, and in the interval since then he has been healthy. No complaints of/perceived chest pain, dyspnea, palpitation, syncope/pre-syncope, easy fatigability. Easily keeps up with peers.    Past medical history:   Past Medical History:   Diagnosis Date     Migraine headache      PONV (postoperative nausea and vomiting)      Truncus arteriosus 7/25/2012    RV hypertension, recurrent conduit stenosis    As above. I reviewed Spike Silva's medical records.    He has a current medication list which includes the following prescription(s): albuterol, aspirin, and cyproheptadine. He is allergic to amoxicillin.    Family and Social History:  unchanged    The Review of Systems is negative other than noted in the HPI.    Physical Examination:  /72 (BP Location: Right arm, Patient Position:  "Sitting, Cuff Size: Adult Large)   Pulse 93   Ht 1.804 m (5' 11.02\")   Wt 77.9 kg (171 lb 11.8 oz)   BMI 23.94 kg/m    GENERAL: Pleasant and conversant, non-distressed  SKIN: Clear, no rash or abnormal pigmentation  HEAD: NC/AT, nondysmorphic  NECK: Supple without lymphadenopathy or thyromegaly  LUNGS: CTAB, normal symmetric air entry, normal WOB, no rales/rhonchi/wheezes  HEART: Quiet precordium, RRR, normal S1/S2, 2/6 JEFFREY along the LUSB, nonradiating, quiet in diastole, no r/g  ABDOMEN: Soft, NT/ND, normoactive BS, no HSM  EXTREMITIES: W/WP, no c/c/e, pulses 2+ throughout without radio-femoral delay  GENITOURINARY: deferred    I reviewed and interpreted Spike's ECG from today, which showed normal sinus rhythm, normal axes and RBBB (QRSduration  146msec), no preexcitation, normal ST-T waves, and normal voltages (?left atrial enlargement).   I reviewed his echo from today, which showed normal RV size and mild RVH with normal systolic function. Mild gradient across the Zarina valve, peak 40mmHg, no insufficiency. No RPA acceleration across the RPA stent. RVSP 64mmHg+RAP. Normal LV size and function. Mild truncal valve flow acceleration to peak 20mmHg, no insufficiency. Dilated truncal root, unchanged. No effusion.    Assessment and Plan: Spike is a 24 year old male with truncus arteriosus . I discussed findings today with s/p repair with an RV-PA conduit, subsequent conduit replacement with a 21mm Biocor valve, now s/p RPA stent and transcatheter 20mm Zarina valve in the conduit, with good results. I hope to get a number of years out of this transcatheter pulmonary valve prior to further intervention. He will follow-up in 1 year with an ECG and echocardiogram; we will again discuss the right timing for transfer to adult congenital cardiology at that visit. He should continue aspirin indefinitely given the Zarina valve. He has no activity restrictions. Yes antibiotic prophylaxis required for invasive " procedures.    Thank you for the opportunity to follow Spike with you. Please don't hesitate to contact me with questions or concerns.    Matthew Galvez MD  Pediatric Cardiology  [facility] [address], 5th floor, [facility] [address]  Phone [phone]  Fax [phone]

## 2020-02-14 ENCOUNTER — COMMUNICATION - HEALTHEAST (OUTPATIENT)
Dept: PEDIATRICS | Facility: CLINIC | Age: 25
End: 2020-02-14

## 2020-03-03 ENCOUNTER — TELEPHONE (OUTPATIENT)
Dept: PEDIATRIC CARDIOLOGY | Facility: CLINIC | Age: 25
End: 2020-03-03

## 2020-03-03 NOTE — TELEPHONE ENCOUNTER
Mom called to check in.  Spike and his family are going on a cruise leaving Thursday.  They are going to the [place]. Mom wanted to know if there are any precautions that Spike should take with his cardiac condition.    Let mom know they should follow the basics, good hand hygiene is the most important.  Mom is concerned that he did not get the influenza vaccine this year.  Told mom he could still go and get it now.  It is not going to be as effective right away but better than nothing.      Mom verbalized understanding and will call back with any questions or concerns.    Maggie Montiel RN Care Coordinator  [place] [facility]

## 2020-07-16 ENCOUNTER — TELEPHONE (OUTPATIENT)
Dept: PEDIATRIC CARDIOLOGY | Facility: CLINIC | Age: 25
End: 2020-07-16

## 2020-07-16 NOTE — TELEPHONE ENCOUNTER
----- Message from Vijay Carter sent at 7/16/2020 11:56 AM CDT -----  Regarding: request for letter from Dr. Galvez regarding restrictions  Is an  Needed: no  Callers Name: Spike Bourne Phone Number: [phone]  Relationship to Patient: self  Best time of day to call: any  Is it ok to leave a detailed voicemail on this number: yes  Reason for Call: Patient needs a new letter from Dr. Galvez that says that he has no restrictions for a new job. He would need it sent to [facility] [employer] [facility]. The fax #: [phone]. He would like a call once the letter is complete.

## 2020-07-16 NOTE — TELEPHONE ENCOUNTER
After discussing with Dr. Galvez, a letter was faxed to the number provided.  Spike was notified.    Maggie Montiel, RN Care Coordinator  [facility]

## 2020-07-17 NOTE — TELEPHONE ENCOUNTER
Pt called; was at [employer] and they did not have letter. Confirmed fax# several times as correct ([phone]). Also obtained secure email: [email or web address]  Please confirm with patient when sent/re-faxed; call with questions. Thanks.

## 2020-07-17 NOTE — TELEPHONE ENCOUNTER
Faxed and emailed requested letter.  Spike is aware.    Maggie Montiel, RN Care Coordinator  [place] [facility]

## 2020-09-25 DIAGNOSIS — Q20.0 TRUNCUS ARTERIOSUS: Primary | ICD-10-CM

## 2020-09-25 DIAGNOSIS — Z95.2 HISTORY OF ARTIFICIAL HEART VALVE: ICD-10-CM

## 2020-10-14 ENCOUNTER — RECORDS - HEALTHEAST (OUTPATIENT)
Dept: ADMINISTRATIVE | Facility: OTHER | Age: 25
End: 2020-10-14

## 2020-10-14 ENCOUNTER — ANCILLARY PROCEDURE (OUTPATIENT)
Dept: CARDIOLOGY | Facility: CLINIC | Age: 25
End: 2020-10-14
Payer: COMMERCIAL

## 2020-10-14 ENCOUNTER — OFFICE VISIT (OUTPATIENT)
Dept: PEDIATRIC CARDIOLOGY | Facility: CLINIC | Age: 25
End: 2020-10-14
Payer: COMMERCIAL

## 2020-10-14 VITALS
WEIGHT: 175.71 LBS | SYSTOLIC BLOOD PRESSURE: 116 MMHG | HEART RATE: 99 BPM | BODY MASS INDEX: 24.6 KG/M2 | DIASTOLIC BLOOD PRESSURE: 69 MMHG | HEIGHT: 71 IN

## 2020-10-14 DIAGNOSIS — Z95.2 HISTORY OF ARTIFICIAL HEART VALVE: ICD-10-CM

## 2020-10-14 DIAGNOSIS — Q20.0 TRUNCUS ARTERIOSUS: ICD-10-CM

## 2020-10-14 DIAGNOSIS — Q20.0 TRUNCUS ARTERIOSUS: Primary | ICD-10-CM

## 2020-10-14 PROCEDURE — 93000 ELECTROCARDIOGRAM COMPLETE: CPT | Performed by: PEDIATRICS

## 2020-10-14 PROCEDURE — 99214 OFFICE O/P EST MOD 30 MIN: CPT | Mod: 25 | Performed by: PEDIATRICS

## 2020-10-14 ASSESSMENT — PAIN SCALES - GENERAL: PAINLEVEL: NO PAIN (0)

## 2020-10-14 ASSESSMENT — MIFFLIN-ST. JEOR: SCORE: 1812

## 2020-10-14 NOTE — PATIENT INSTRUCTIONS
[facility]  Pediatric Specialty Clinic [place]      Pediatric Call Center Scheduling and Nurse Questions:  [phone]  Maggie Montiel RN Care Coordinator    After Hours Needing Immediate Care:  [phone].  Ask for the on-call pediatric doctor for the specialty you are calling for be paged.  For dermatology urgent matters that cannot wait until the next business day, is over a holiday and/or a weekend please call [phone] and ask for the Dermatology Resident On-Call to be paged.    Prescription Renewals:  Please call your pharmacy first.  Your pharmacy must fax requests to [phone].  Please allow 2-3 days for prescriptions to be authorized.    If your physician has ordered a CT or MRI, you may schedule this test by calling [facility]iology in [place] at [phone].    **If your child is having a sedated procedure, they will need a history and physical done at their Primary Care Provider within 30 days of the procedure.  If your child was seen by the ordering provider in our office within 30 days of the procedure, their visit summary will work for the H&P unless they inform you otherwise.  If you have any questions, please call the RN Care Coordinator.**

## 2020-10-14 NOTE — LETTER
10/14/2020      RE: Spike Silva  [address] [place] [address]       Pediatric Cardiology Visit    Patient:  Spike Silva MRN:  [id number]   YOB: 1995 Age:  25 year old   Date of Visit:  10/14/2020 PCP:  Attila Lombardo MD     Dear Dr. Lombardo:    I had the pleasure of seeing Spike Silva at the [facility] in [place] on 10/14/2020 in ongoing consultation for truncus arteriosus. He presented today accompanied by mom. As you know, he is a 25 year old male with truncus arteriosus, s/p initial PA banding, subsequent complete repair with RV-PA conduit and VSD repair, now s/p PVR with a 21mm porcine Biocor valve in 2008 with Dr. Sexton. In 2017 I was concerned for progressive RV-PA conduit stenosis, and arranged for a CT angiogram to better evaluate this, which showed significant obstruction. He underwent cardiac catheterization with my colleague Dr. Matt; the Biocor pulmonary valve was dilated with an 18mm MARC balloon, followed by balloon angioplasty and placement of a [id number] LD Max stent on 16mm BiB in the proximal RPA. He then had balloon dilation of the distal conduit, pre-stenting, and implantation of a Zarnia TPV 20. I last saw him in 8/2019, and in the interval since then he has been healthy. Just started working as a . No complaints of/perceived chest pain, dyspnea, palpitation, syncope/pre-syncope, easy fatigability. Easily keeps up with peers.    Past medical history:   Past Medical History:   Diagnosis Date     Migraine headache      PONV (postoperative nausea and vomiting)      Truncus arteriosus 7/25/2012    RV hypertension, recurrent conduit stenosis    As above. I reviewed Spike Silva's medical records.    He has a current medication list which includes the following prescription(s): albuterol, aspirin, and cyproheptadine. He is allergic to amoxicillin.    Family  "and Social History:  Lives with his girlfriend in [place]. No tobacco exposures.    The Review of Systems is negative other than noted in the HPI.    Physical Examination:  /69 (BP Location: Right arm, Patient Position: Sitting, Cuff Size: Adult Regular)   Pulse 99   Ht 1.8 m (5' 10.87\")   Wt 79.7 kg (175 lb 11.3 oz)   BMI 24.60 kg/m    GENERAL: Pleasant and conversant, non-distressed  SKIN: Clear, no rash or abnormal pigmentation  HEAD: NC/AT, nondysmorphic  NECK: Supple without lymphadenopathy or thyromegaly  LUNGS: CTAB, normal symmetric air entry, normal WOB, no rales/rhonchi/wheezes  HEART: Quiet precordium, RRR, normal S1, split S2, 2/6 JEFFREY along the LUSB, quiet in diastole, no r/g  ABDOMEN: Soft, NT/ND, normoactive BS, no HSM   EXTREMITIES: W/WP, no c/c/e, pulses 2+ throughout without radio-femoral delay  GENITOURINARY: deferred    I reviewed and interpreted Spike's ECG from today, which showed normal sinus rhythm, normal axes and RBBB (QRSduration 150msec), no preexcitation, normal ST-T waves, and normal voltages (?left atrial enlargement).   I reviewed his echo from today, which showed normal RV size and mild RVH with normal systolic function. Mild gradient across the Zarina valve, peak 35mmHg, no insufficiency. No RPA acceleration across the RPA stent. RVSP 63mmHg+RAP. Normal LV size and function. Mild truncal valve flow acceleration to peak 21mmHg, no insufficiency. Dilated truncal root, unchanged. No effusion.    Assessment and Plan: Spike is a 25 year old male with truncus arteriosus s/p repair with an RV-PA conduit, subsequent conduit replacement with a 21mm Biocor valve, now s/p RPA stent and transcatheter 20mm Zarina valve in the conduit, with good results. I discussed findings today with Spike and his mother. He will follow-up in 1 year with an ECG and echocardiogram; we discussed using that visit as his transition to adult congenital cardiology care. He has no activity restrictions. No " antibiotic prophylaxis required for invasive procedures.    Thank you for the opportunity to follow Spike with you. Please don't hesitate to contact me with questions or concerns.    Matthew Galvez MD  Pediatric Cardiology  [facility]'[address], 5th floor, [facility] [address]  Phone [phone]  Fax [phone]        Matthew Galvez MD

## 2020-10-14 NOTE — NURSING NOTE
"[facility] [[id number]]  Chief Complaint   Patient presents with     RECHECK     Follow-up on Truncus Repair.     Initial /69 (BP Location: Right arm, Patient Position: Sitting, Cuff Size: Adult Regular)   Pulse 99   Ht 1.8 m (5' 10.87\")   Wt 79.7 kg (175 lb 11.3 oz)   BMI 24.60 kg/m   Estimated body mass index is 24.6 kg/m  as calculated from the following:    Height as of this encounter: 1.8 m (5' 10.87\").    Weight as of this encounter: 79.7 kg (175 lb 11.3 oz).  Medication Reconciliation: complete    "

## 2020-10-14 NOTE — PROGRESS NOTES
Pediatric Cardiology Visit    Patient:  Spike Silva MRN:  [id number]   YOB: 1995 Age:  25 year old   Date of Visit:  10/14/2020 PCP:  Attila Lombardo MD     Dear Dr. Lombardo:    I had the pleasure of seeing Spike Silva at the [facility] in [place] on 10/14/2020 in ongoing consultation for truncus arteriosus. He presented today accompanied by mom. As you know, he is a 25 year old male with truncus arteriosus, s/p initial PA banding, subsequent complete repair with RV-PA conduit and VSD repair, now s/p PVR with a 21mm porcine Biocor valve in 2008 with Dr. Sexton. In 2017 I was concerned for progressive RV-PA conduit stenosis, and arranged for a CT angiogram to better evaluate this, which showed significant obstruction. He underwent cardiac catheterization with my colleague Dr. Matt; the Biocor pulmonary valve was dilated with an 18mm MARC balloon, followed by balloon angioplasty and placement of a [id number] LD Max stent on 16mm BiB in the proximal RPA. He then had balloon dilation of the distal conduit, pre-stenting, and implantation of a Zarina TPV 20. I last saw him in 8/2019, and in the interval since then he has been healthy. Just started working as a . No complaints of/perceived chest pain, dyspnea, palpitation, syncope/pre-syncope, easy fatigability. Easily keeps up with peers.    Past medical history:   Past Medical History:   Diagnosis Date     Migraine headache      PONV (postoperative nausea and vomiting)      Truncus arteriosus 7/25/2012    RV hypertension, recurrent conduit stenosis    As above. I reviewed Spike Silva's medical records.    He has a current medication list which includes the following prescription(s): albuterol, aspirin, and cyproheptadine. He is allergic to amoxicillin.    Family and Social History:  Lives with his girlfriend in [place]. No tobacco exposures.    The Review  "of Systems is negative other than noted in the HPI.    Physical Examination:  /69 (BP Location: Right arm, Patient Position: Sitting, Cuff Size: Adult Regular)   Pulse 99   Ht 1.8 m (5' 10.87\")   Wt 79.7 kg (175 lb 11.3 oz)   BMI 24.60 kg/m    GENERAL: Pleasant and conversant, non-distressed  SKIN: Clear, no rash or abnormal pigmentation  HEAD: NC/AT, nondysmorphic  NECK: Supple without lymphadenopathy or thyromegaly  LUNGS: CTAB, normal symmetric air entry, normal WOB, no rales/rhonchi/wheezes  HEART: Quiet precordium, RRR, normal S1, split S2, 2/6 JEFFREY along the LUSB, quiet in diastole, no r/g  ABDOMEN: Soft, NT/ND, normoactive BS, no HSM   EXTREMITIES: W/WP, no c/c/e, pulses 2+ throughout without radio-femoral delay  GENITOURINARY: deferred    I reviewed and interpreted Spike's ECG from today, which showed normal sinus rhythm, normal axes and RBBB (QRSduration 150msec), no preexcitation, normal ST-T waves, and normal voltages (?left atrial enlargement).   I reviewed his echo from today, which showed normal RV size and mild RVH with normal systolic function. Mild gradient across the Zarina valve, peak 35mmHg, no insufficiency. No RPA acceleration across the RPA stent. RVSP 63mmHg+RAP. Normal LV size and function. Mild truncal valve flow acceleration to peak 21mmHg, no insufficiency. Dilated truncal root, unchanged. No effusion.    Assessment and Plan: Spike is a 25 year old male with truncus arteriosus s/p repair with an RV-PA conduit, subsequent conduit replacement with a 21mm Biocor valve, now s/p RPA stent and transcatheter 20mm Zarina valve in the conduit, with good results. I discussed findings today with Spike and his mother. He will follow-up in 1 year with an ECG and echocardiogram; we discussed using that visit as his transition to adult congenital cardiology care. He has no activity restrictions. No antibiotic prophylaxis required for invasive procedures.    Thank you for the opportunity to " follow Spike with you. Please don't hesitate to contact me with questions or concerns.    Matthew Galvez MD  Pediatric Cardiology  [facility]'s [place] [id number], 5th floor, [facility] [address]  Phone [phone]  Fax [phone]

## 2020-10-20 LAB — INTERPRETATION ECG - MUSE: NORMAL

## 2021-05-31 VITALS — HEIGHT: 71 IN | WEIGHT: 178.4 LBS | BODY MASS INDEX: 24.98 KG/M2

## 2021-05-31 VITALS — BODY MASS INDEX: 25.09 KG/M2 | WEIGHT: 179.9 LBS

## 2021-06-06 NOTE — TELEPHONE ENCOUNTER
He needs to be seen in clinic, last visit > 2 years ago.  I cannot fill rx today  He should be seen by an adult provider

## 2021-06-06 NOTE — TELEPHONE ENCOUNTER
Former patient of Dr. Lombardo & has not established care with another provider.  Please assign refill request to covering provider per Clinic standard process.

## 2021-06-06 NOTE — TELEPHONE ENCOUNTER
Last Office Visit  9/25/2017 Attila Lombardo MD    No consent on file to communicate.  Pt needs to schedule appt for medication check at this time.      Will route to PCP for review

## 2021-06-12 NOTE — PROGRESS NOTES
Subjective:         Scheduled Procedure: Valve Replacement - cardia catheterization  Surgery Date:  9/15/17  Surgery Location:  [facility]  Surgeon:  Dr. Sweeney       Chief Complaint: truncus arteriosis  History of Present Illness: Followed at the [facility].  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    Planning on a repair by cardiac catheterization.      Patient Active Problem List   Diagnosis     Migraine Headache     Other Specified Adverse Effects, Not Elsewhere Classified     S/P repair of truncus arteriosus     Other specified adverse effects-he gets a bad headache from influenza vaccines.      Allergies   Allergen Reactions     Amoxicillin        Immunizations: Up to Date    PMH: neg for anesthesia reactions    Past Surgical History:   Procedure Laterality Date     [place] REPAIR TRUNCUS ARTERIOSUS      Description: Truncus Arteriosus Repair;  Proc Date: 04/01/1998;  Comments: again 7-9-08. ; Bigger valve placed.; See opp report.       Social History     Social History     Marital status: Single     Spouse name: N/A     Number of children: N/A     Years of education: N/A     Occupational History     Not on file.     Social History Main Topics     Smoking status: Never Smoker     Smokeless tobacco: Not on file     Alcohol use Not on file     Drug use: Not on file     Sexual activity: Not on file     Other Topics Concern     Not on file     Social History Narrative         SH: tobacco use or exposure - no           Family History: neg for fever with anesthesia       Neg for prolonged sedation     Neg for bleeding problems      Pertinent History  Prior Anesthesia: Yes  Previous Anesthesia Reaction:  no  Diabetes: no  Cardiovascular Disease: no  Pulmonary Disease: no  Renal Disease: no  GI Disease: no  Sleep Apnea: no  Clotting Disorder: no  Bleeding Disorder: no  Transfusion Reaction:  "no  Impaired Immunity: no  Steroid use in the last 6 months: no  Frequent Aspirin use: no  Ibuprofen in the last 10 days: no   Pertinent History per Thompson Busby CMA      Review of Systems  Constitutional (fever, wt. Loss, fatigue):  Normal  HEENT: Normal  Respiratory: Normal  Cardiovascular: Normal  GI/Hepatic: Normal  Neuro: Normal  Urinary Tract/Renal: Normal  Endocrine: Normal  Mental/Development: Normal  Vision/Hearin: Normal  Musculoskeletal: Normal  Skin: Normal  Hematololgic/Lymhpatic: Normal. No bleeding disorder. No clotting disorder.  Tobacco/Alcohol/Drug Use: Normal / NA      Exposure in the past 3 weeks to:  Chicken pox:  No  Fifth Disease:  No  Whooping Cough: No  Measles:  No  Tuberculosis: No  Other: No   Exposure history per -Vera Busby CMA       Current Outpatient Prescriptions   Medication Sig Dispense Refill     cyproheptadine (PERIACTIN) 4 mg tablet        divalproex (DEPAKOTE) 500 MG 24 hr tablet Take 2 tablets by mouth bedtime.  1     albuterol (PROVENTIL) 2.5 mg /3 mL (0.083 %) nebulizer solution Take 2.5 mg by nebulization. Every 4-6 hours as needed as directed.       isometheptene-acetaminophen-dichloralphenazone (MIDRIN) -325 mg per capsule        No current facility-administered medications for this visit.      Has not needed albuterol for 2 years.    Any use of aspirin or ibuprofen within 7 days of surgery?  No      Objective:         Vitals:    09/01/17 1600   BP: 122/66   Pulse: 76   Temp: 98.1  F (36.7  C)   TempSrc: Temporal   Weight: 178 lb 6.4 oz (80.9 kg)   Height: 5' 10.75\" (1.797 m)              Physical Exam:    Gen: Awake, Alert and Cooperative  Head: Normocephalic  Eyes: PERRLA and EOM, RR++, symmetric light reflex  ENT: Right TM clear   Left TM clear    and oropharynx clear  Neck: supple  Lungs: Clear to auscultation bilaterally  CV: Normal S1 & S2 with regular rate and rhythm, g 4/6 systolic murmur present  Abd: Soft, nontender, non distended, no masses or " hepatosplenomegaly  : Not examined  MSK: Moving all extremities and normal tone      Neuro:    DTRs 2+/4+  Skin: No rashes or lesions; no jaundice        Lab (hgb, A)/Studies (CXR, EKG, Head CT):    None    Assessment/Plan:      Visit for Preoperative Exam.     1. Visit for pre-operative examination    2. S/P repair of truncus arteriosus        Patient approved for surgery with general or local anesthesia.

## 2021-06-13 NOTE — PROGRESS NOTES
Roomed by: Tara Damon:    09/25/17 1139   BP: 106/60   Pulse: 90   Temp: 97.2  F (36.2  C)   SpO2: 100%       Chief Complaint   Patient presents with     Follow-up     post op Valve & Stent replacement        HPI:    Had surgery 9-15-17  Surgery was done through an incision in both legs at the groin.    Went all right  3 stents and one valve    It took 6.5 hours    Has follow up with the surgeon in a month    I did review the notes from the hospital.          ROS:      No chest pain    No redness or drainage from the incision    SH:   no one else ill at home          ================================    Physical Exam:    General Appearance:   Alert, NAD   Lungs:  clear                Cardiac:   S1, S2 nl, g3/6 systolic murmur  The surgical site in the groin are both well-healed.  No redness, no drainage.  Some bruising on the left side.      No orders of the defined types were placed in this encounter.       Assessment:    1. No post-op complications        Plan: See Patient Instructions.    No medications were ordered this encounter      Patient Instructions   You should get your flu shot.   (Does it with family)    Keep follow up appointment with your surgeon.    Come back to see me in March 2018 for a physical.

## 2021-06-20 NOTE — LETTER
Letter by Attila Lombardo MD at      Author: Attila Lombardo MD Service: -- Author Type: --    Filed:  Encounter Date: 2/14/2020 Status: (Other)       Spike ENRIQUE Silva  [address] [place] [address]      02/19/20      Dear Spike,      In reviewing your records, we have determined an appointment is needed, please call the [facility] to schedule a:      Annual Wellness Visit/Physical      We have made attempts to call you for an appointment, please verify your contact information is correct when calling back for an appointment, or if you have transferred your care to another clinic, please contact us so we can update our records.     Please call [phone] to schedule an appointment.    We believe that a strong preventative care program, including regular physicals and follow-up care is an important part of a healthy lifestyle and we are committed to helping you maintain your health.    Thank you for choosing us as your health care provider.    Sincerely,    Francie Ramsey   CMA - CMT/CA  [facility]  [address] [place] [address]  [phone]

## 2022-01-26 ENCOUNTER — ANCILLARY PROCEDURE (OUTPATIENT)
Dept: CARDIOLOGY | Facility: CLINIC | Age: 27
End: 2022-01-26
Payer: COMMERCIAL

## 2022-01-26 ENCOUNTER — OFFICE VISIT (OUTPATIENT)
Dept: PEDIATRIC CARDIOLOGY | Facility: CLINIC | Age: 27
End: 2022-01-26
Payer: COMMERCIAL

## 2022-01-26 VITALS
DIASTOLIC BLOOD PRESSURE: 83 MMHG | WEIGHT: 174.16 LBS | BODY MASS INDEX: 24.38 KG/M2 | SYSTOLIC BLOOD PRESSURE: 130 MMHG | HEART RATE: 80 BPM | HEIGHT: 71 IN

## 2022-01-26 DIAGNOSIS — Q20.0 TRUNCUS ARTERIOSUS: ICD-10-CM

## 2022-01-26 DIAGNOSIS — Z95.2 HISTORY OF ARTIFICIAL HEART VALVE: ICD-10-CM

## 2022-01-26 LAB
ATRIAL RATE - MUSE: 66 BPM
DIASTOLIC BLOOD PRESSURE - MUSE: NORMAL MMHG
INTERPRETATION ECG - MUSE: NORMAL
P AXIS - MUSE: 47 DEGREES
PR INTERVAL - MUSE: 186 MS
QRS DURATION - MUSE: 146 MS
QT - MUSE: 434 MS
QTC - MUSE: 454 MS
R AXIS - MUSE: 82 DEGREES
SYSTOLIC BLOOD PRESSURE - MUSE: NORMAL MMHG
T AXIS - MUSE: 82 DEGREES
VENTRICULAR RATE- MUSE: 66 BPM

## 2022-01-26 PROCEDURE — 93303 ECHO TRANSTHORACIC: CPT | Performed by: PEDIATRICS

## 2022-01-26 PROCEDURE — 93325 DOPPLER ECHO COLOR FLOW MAPG: CPT | Performed by: PEDIATRICS

## 2022-01-26 PROCEDURE — 93320 DOPPLER ECHO COMPLETE: CPT | Performed by: PEDIATRICS

## 2022-01-26 PROCEDURE — 99214 OFFICE O/P EST MOD 30 MIN: CPT | Mod: 25 | Performed by: PEDIATRICS

## 2022-01-26 ASSESSMENT — MIFFLIN-ST. JEOR: SCORE: 1800

## 2022-01-26 NOTE — PATIENT INSTRUCTIONS
[facility]  Pediatric Specialty Clinic [place]      Pediatric Call Center Scheduling and Nurse Questions:  [phone]  Maggie Montiel, RN Care Coordinator    After hours urgent matters that cannot wait until the next business day:  [phone].  Ask for the on-call pediatric doctor for the specialty you are calling for be paged.    For dermatology urgent matters that cannot wait until the next business day, is over a holiday and/or a weekend please call [phone] and ask for the Dermatology Resident On-Call to be paged.    Prescription Renewals:  Please call your pharmacy first.  Your pharmacy must fax requests to [phone].  Please allow 2-3 days for prescriptions to be authorized.    If your physician has ordered a CT or MRI, you may schedule this test by calling [facility] in [place] at [phone].    **If your child is having a sedated procedure, they will need a history and physical done at their Primary Care Provider within 30 days of the procedure.  If your child was seen by the ordering provider in our office within 30 days of the procedure, their visit summary will work for the H&P unless they inform you otherwise.  If you have any questions, please call the RN Care Coordinator.**    **If your child is going to be admitted to [facility] for testing or a procedure, they will need a PCR COVID test within 4 days of admission.  A [facility] scheduling team should be contacting you to schedule.  If you do not hear from them, you can call [phone] to schedule**

## 2022-01-26 NOTE — Clinical Note
1/26/2022      RE: Spike Silva  [address] [place] [address]       No notes on file    Matthew Galvez MD

## 2022-01-26 NOTE — NURSING NOTE
"Chief Complaint   Patient presents with     RECHECK     Patient being seen for Follow-up       /83 (BP Location: Right arm, Patient Position: Sitting, Cuff Size: Adult Regular)   Pulse 80   Ht 1.8 m (5' 10.87\")   Wt 79 kg (174 lb 2.6 oz)   BMI 24.38 kg/m      Edson Zabala LPN  January 26, 2022  "

## 2022-01-26 NOTE — LETTER
1/26/2022      RE: Spike Silva  [address] [place] [address]       Pediatric Cardiology Visit    Patient:  Spike Silva MRN:  [id number]   YOB: 1995 Age:  26 year old   Date of Visit:  1/26/2022 PCP:  Attila Lombardo MD     Dear Dr. Lombardo:    I had the pleasure of seeing Spike Silva at the [facility] in [place] on 1/26/2022 in ongoing consultation for truncus arteriosus. He presented today accompanied by mom. As you know, he is a 25 year old male with truncus arteriosus, s/p initial PA banding, subsequent complete repair with RV-PA conduit and VSD repair, now s/p PVR with a 21mm porcine Biocor valve in 2008 with Dr. Sexton. In 2017 I was concerned for progressive RV-PA conduit stenosis, and arranged for a CT angiogram to better evaluate this, which showed significant obstruction. He underwent cardiac catheterization with my colleague Dr. Matt; the Biocor pulmonary valve was dilated with an 18mm MARC balloon, followed by balloon angioplasty and placement of a [id number] LD Max stent on 16mm BiB in the proximal RPA. He then had balloon dilation of the distal conduit, pre-stenting, and implantation of a Zarina TPV 20. I last saw him in 8/2019, and in the interval since then he has been healthy. He is working as a . No complaints of/perceived chest pain, dyspnea, palpitation, syncope/pre-syncope, easy fatigability. Active and easily keeps up with peers.    Past medical history:   Past Medical History:   Diagnosis Date     Migraine headache      PONV (postoperative nausea and vomiting)      Truncus arteriosus 7/25/2012    RV hypertension, recurrent conduit stenosis    As above. I reviewed Spike Silva's medical records.    He has a current medication list which includes the following prescription(s): albuterol, aspirin, and cyproheptadine. He is allergic to amoxicillin.    Family and Social  "History:  Lives with his girlfriend in [place]. No tobacco exposures.    The Review of Systems is negative other than noted in the HPI.    Physical Examination:  /83 (BP Location: Right arm, Patient Position: Sitting, Cuff Size: Adult Regular)   Pulse 80   Ht 1.8 m (5' 10.87\")   Wt 79 kg (174 lb 2.6 oz)   BMI 24.38 kg/m      GENERAL: Pleasant and conversant, non-distressed  SKIN: Clear, no rash or abnormal pigmentation  HEAD: NC/AT, nondysmorphic  NECK: Supple without lymphadenopathy or thyromegaly  LUNGS: CTAB, normal symmetric air entry, normal WOB, no rales/rhonchi/wheezes  HEART: Quiet precordium, RRR, normal S1, split S2, 2/6 JEFFREY along the LUSB, quiet in diastole, no r/g  ABDOMEN: Soft, NT/ND, normoactive BS, no HSM   EXTREMITIES: W/WP, no c/c/e, pulses 2+ throughout without radio-femoral delay  GENITOURINARY: deferred    I reviewed and interpreted Spike's ECG from today, which showed normal sinus rhythm, normal axes and RBBB (QRSduration 146 msec), no preexcitation, normal ST-T waves, and normal voltages.    I reviewed his echo from today, which showed normal RV size and mild RVH with normal systolic function. Mild gradient across the Zarina valve, peak 51 mmHg, no insufficiency. No RPA acceleration across the RPA stent. RVSP 64 mmHg+RAP. Normal LV size and function. Mild truncal valve flow acceleration with mean of 21 mmHg and a peak of 38 mmHg and mild insufficiency. Dilated truncal root, unchanged. No effusion.    Assessment and Plan: Spike is a 25 year old male with truncus arteriosus s/p repair with an RV-PA conduit, subsequent conduit replacement with a 21 mm Biocor valve, now s/p RPA stent and transcatheter 20 mm Zarina valve in the conduit, with good results. I discussed findings today with Spike and his mother. He will follow-up in 1 year with an ECG and echocardiogram; we discussed using that visit as his transition to adult congenital cardiology care. He has no activity restrictions. No " antibiotic prophylaxis required for invasive procedures.    Thank you for the opportunity to follow Spike with you. Please don't hesitate to contact me with questions or concerns.    Matthew Galvez MD  Pediatric Cardiology  [facility]'[address], 5th floor, [facility] [address]  Phone [phone]  Fax [phone]

## 2022-01-26 NOTE — PROGRESS NOTES
Pediatric Cardiology Visit    Patient:  Spike Silva MRN:  [id number]   YOB: 1995 Age:  26 year old   Date of Visit:  1/26/2022 PCP:  Attila Lombardo MD     Dear Dr. Lombardo:    I had the pleasure of seeing Spike Silva at the [facility] in [place] on 1/26/2022 in ongoing consultation for truncus arteriosus. He presented today accompanied by mom. As you know, he is a 25 year old male with truncus arteriosus, s/p initial PA banding, subsequent complete repair with RV-PA conduit and VSD repair, now s/p PVR with a 21mm porcine Biocor valve in 2008 with Dr. Sexton. In 2017 I was concerned for progressive RV-PA conduit stenosis, and arranged for a CT angiogram to better evaluate this, which showed significant obstruction. He underwent cardiac catheterization with my colleague Dr. Matt; the Biocor pulmonary valve was dilated with an 18mm MRAC balloon, followed by balloon angioplasty and placement of a [id number] LD Max stent on 16mm BiB in the proximal RPA. He then had balloon dilation of the distal conduit, pre-stenting, and implantation of a Zarina TPV 20. I last saw him in 8/2019, and in the interval since then he has been healthy. He is working as a . No complaints of/perceived chest pain, dyspnea, palpitation, syncope/pre-syncope, easy fatigability. Active and easily keeps up with peers.    Past medical history:   Past Medical History:   Diagnosis Date     Migraine headache      PONV (postoperative nausea and vomiting)      Truncus arteriosus 7/25/2012    RV hypertension, recurrent conduit stenosis    As above. I reviewed Spike Silva's medical records.    He has a current medication list which includes the following prescription(s): albuterol, aspirin, and cyproheptadine. He is allergic to amoxicillin.    Family and Social History:  Lives with his girlfriend in [place]. No tobacco exposures.    The Review  "of Systems is negative other than noted in the HPI.    Physical Examination:  /83 (BP Location: Right arm, Patient Position: Sitting, Cuff Size: Adult Regular)   Pulse 80   Ht 1.8 m (5' 10.87\")   Wt 79 kg (174 lb 2.6 oz)   BMI 24.38 kg/m      GENERAL: Pleasant and conversant, non-distressed  SKIN: Clear, no rash or abnormal pigmentation  HEAD: NC/AT, nondysmorphic  NECK: Supple without lymphadenopathy or thyromegaly  LUNGS: CTAB, normal symmetric air entry, normal WOB, no rales/rhonchi/wheezes  HEART: Quiet precordium, RRR, normal S1, split S2, 2/6 JEFFREY along the LUSB, quiet in diastole, no r/g  ABDOMEN: Soft, NT/ND, normoactive BS, no HSM   EXTREMITIES: W/WP, no c/c/e, pulses 2+ throughout without radio-femoral delay  GENITOURINARY: deferred    I reviewed and interpreted Spike's ECG from today, which showed normal sinus rhythm, normal axes and RBBB (QRSduration 146 msec), no preexcitation, normal ST-T waves, and normal voltages.    I reviewed his echo from today, which showed normal RV size and mild RVH with normal systolic function. Mild gradient across the Zarina valve, peak 51 mmHg, no insufficiency. No RPA acceleration across the RPA stent. RVSP 64 mmHg+RAP. Normal LV size and function. Mild truncal valve flow acceleration with mean of 21 mmHg and a peak of 38 mmHg and mild insufficiency. Dilated truncal root, unchanged. No effusion.    Assessment and Plan: Spike is a 25 year old male with truncus arteriosus s/p repair with an RV-PA conduit, subsequent conduit replacement with a 21 mm Biocor valve, now s/p RPA stent and transcatheter 20 mm Zarina valve in the conduit, with good results. I discussed findings today with Spike and his mother. He will follow-up in 1 year with an ECG and echocardiogram; we discussed using that visit as his transition to adult congenital cardiology care. He has no activity restrictions. No antibiotic prophylaxis required for invasive procedures.    Thank you for the " opportunity to follow Spike with you. Please don't hesitate to contact me with questions or concerns.    Matthew Galvez MD  Pediatric Cardiology  [facility]'s [address], 5th floor, [facility] [address]  Phone [phone]  Fax [phone]

## 2022-02-25 ENCOUNTER — ANCILLARY PROCEDURE (OUTPATIENT)
Dept: CARDIOLOGY | Facility: CLINIC | Age: 27
End: 2022-02-25
Payer: COMMERCIAL

## 2022-02-25 DIAGNOSIS — Q20.0 TRUNCUS ARTERIOSUS: ICD-10-CM

## 2022-02-25 PROCEDURE — 93242 EXT ECG>48HR<7D RECORDING: CPT

## 2022-05-31 DIAGNOSIS — Z95.2 HISTORY OF ARTIFICIAL HEART VALVE: Primary | ICD-10-CM

## 2022-11-19 ENCOUNTER — HEALTH MAINTENANCE LETTER (OUTPATIENT)
Age: 27
End: 2022-11-19

## 2023-06-08 ENCOUNTER — TELEPHONE (OUTPATIENT)
Dept: EMERGENCY MEDICINE | Facility: CLINIC | Age: 28
End: 2023-06-08
Payer: COMMERCIAL

## 2023-06-08 NOTE — TELEPHONE ENCOUNTER
[facility] Call Center    Phone Message    May a detailed message be left on voicemail: yes     Reason for Call: Other: Pt called requesting approval to take Verapamil 180ml Extended Release with current heart condition and would like a call back please.      Action Taken: Other: Cardio    Travel Screening: Not Applicable

## 2023-06-09 NOTE — TELEPHONE ENCOUNTER
Called Spike and let him know Dr. Galvez is ok with him taking verapamil.  Spike verbalized understanding and will call back with any further questions.

## 2023-10-09 DIAGNOSIS — Z95.2 HISTORY OF ARTIFICIAL HEART VALVE: ICD-10-CM

## 2023-10-09 DIAGNOSIS — Q20.0 TRUNCUS ARTERIOSUS: Primary | ICD-10-CM

## 2023-10-11 ENCOUNTER — OFFICE VISIT (OUTPATIENT)
Dept: PEDIATRIC CARDIOLOGY | Facility: CLINIC | Age: 28
End: 2023-10-11
Payer: COMMERCIAL

## 2023-10-11 ENCOUNTER — ANCILLARY PROCEDURE (OUTPATIENT)
Dept: CARDIOLOGY | Facility: CLINIC | Age: 28
End: 2023-10-11
Payer: COMMERCIAL

## 2023-10-11 VITALS
BODY MASS INDEX: 25.25 KG/M2 | HEIGHT: 71 IN | HEART RATE: 82 BPM | SYSTOLIC BLOOD PRESSURE: 124 MMHG | WEIGHT: 180.34 LBS | DIASTOLIC BLOOD PRESSURE: 80 MMHG

## 2023-10-11 DIAGNOSIS — Q20.0 TRUNCUS ARTERIOSUS: ICD-10-CM

## 2023-10-11 DIAGNOSIS — Z95.2 HISTORY OF ARTIFICIAL HEART VALVE: ICD-10-CM

## 2023-10-11 PROCEDURE — 93000 ELECTROCARDIOGRAM COMPLETE: CPT | Performed by: PEDIATRICS

## 2023-10-11 PROCEDURE — 93303 ECHO TRANSTHORACIC: CPT | Performed by: PEDIATRICS

## 2023-10-11 PROCEDURE — 99214 OFFICE O/P EST MOD 30 MIN: CPT | Mod: 25 | Performed by: PEDIATRICS

## 2023-10-11 PROCEDURE — 93325 DOPPLER ECHO COLOR FLOW MAPG: CPT | Performed by: PEDIATRICS

## 2023-10-11 PROCEDURE — 93320 DOPPLER ECHO COMPLETE: CPT | Performed by: PEDIATRICS

## 2023-10-11 RX ORDER — TOPIRAMATE 25 MG/1
TABLET, FILM COATED ORAL
COMMUNITY
Start: 2023-10-10

## 2023-10-11 ASSESSMENT — PAIN SCALES - GENERAL: PAINLEVEL: NO PAIN (0)

## 2023-10-11 NOTE — NURSING NOTE
"[facility] [[id number]]  Chief Complaint   Patient presents with    Follow Up     Truncus arterious     Initial /80 (BP Location: Right arm, Patient Position: Sitting, Cuff Size: Adult Regular)   Pulse 82   Ht 1.8 m (5' 10.87\")   Wt 81.8 kg (180 lb 5.4 oz)   BMI 25.25 kg/m   Estimated body mass index is 25.25 kg/m  as calculated from the following:    Height as of this encounter: 1.8 m (5' 10.87\").    Weight as of this encounter: 81.8 kg (180 lb 5.4 oz).  Medication Reconciliation: complete    Does the patient need any medication refills today? No    Does the patient/parent need MyChart or Proxy acces today? No    Does the patient want a flu shot today? No          "

## 2023-10-11 NOTE — LETTER
10/11/2023      RE: Spike Silva  [address] [place] [address]     Dear Colleague,    Thank you for the opportunity to participate in the care of your patient, Spike Silva, at the [facility]. Please see a copy of my visit note below.    Pediatric Cardiology Visit    Patient:  Spike Silva MRN:  [id number]   YOB: 1995 Age:  28 year old   Date of Visit:  10/11/2023 PCP:  Maria Del Carmen Vasquez, APRN CNP     Dear Dr. Vasquez:    I had the pleasure of seeing Spike Silva at the [facility] in [place] on 10/11/2023 in ongoing consultation for truncus arteriosus. He presented today by himself. Today's history obtained from Spike. As you know, he is a 28 year old male with truncus arteriosus, status-post initial pulmonary artery banding, subsequent complete repair with RV-PA conduit and patch ventricular septal defect repair; subsequently status-post surgical pulmonary valve replacement with a 21mm porcine Biocor valve in 2008 with Dr. Sexton. In 2017 I was concerned for progressive RV-PA conduit stenosis, and arranged for a CT angiogram to better evaluate this, which showed significant obstruction. He underwent cardiac catheterization with my colleague Dr. Matt; the Biocor pulmonary valve was dilated with an 18mm MARC balloon, followed by balloon angioplasty and placement of a [id number] LD Max stent on 16mm BiB in the proximal RPA. He then had balloon dilation of the distal conduit, pre-stenting, and implantation of a Zarina TPV 20. I last saw him in 1/2022, and in the interval since then he has been healthy. He is working as a . Getting  next week!  No complaints of/perceived chest pain, dyspnea, palpitation, syncope/pre-syncope, easy fatigability. Easily keeps up with  "peers.    Past medical history:   Past Medical History:   Diagnosis Date    Migraine headache     PONV (postoperative nausea and vomiting)     Truncus arteriosus 7/25/2012    RV hypertension, recurrent conduit stenosis    As above. I reviewed Spike Silva's medical records.    He has a current medication list which includes the following prescription(s): albuterol, aspirin, cyproheptadine, and topiramate. He is allergic to amoxicillin.    Family and Social History:  As above.    The Review of Systems is negative other than noted in the HPI.    Physical Examination:  /80 (BP Location: Right arm, Patient Position: Sitting, Cuff Size: Adult Regular)   Pulse 82   Ht 1.8 m (5' 10.87\")   Wt 81.8 kg (180 lb 5.4 oz)   BMI 25.25 kg/m    GENERAL: Pleasant and conversant, non-distressed  SKIN: Clear, no rash or abnormal pigmentation  HEAD: NC/AT, nondysmorphic  NECK: Supple without lymphadenopathy or thyromegaly  LUNGS: CTAB, normal symmetric air entry, normal WOB, no rales/rhonchi/wheezes  HEART: Quiet precordium, RRR, normal S1, split S2, 2/6 JEFFREY along the LUSB, 1/4 diastolic rumble best at the upper sternal borders, no r/g  ABDOMEN: Soft, NT/ND, normoactive BS, no HSM  EXTREMITIES: W/WP, no c/c/e, pulses 2+ throughout without radio-femoral delay  GENITOURINARY: deferred    I reviewed and interpreted Spike's ECG from today, which showednormal sinus rhythm, normal axes and RBBB (QRSduration 146 msec), no preexcitation, normal ST-T waves, and normal voltages.     I reviewed his echo from today, which showed normal RV size and mild RVH with normal systolic function. Mild gradient across the Zarina valve, peak 37mmHg, no insufficiency. No RPA acceleration across the RPA stent. RVSP  57 mmHg+RAP. Normal LV size and function. Mild truncal valve flow acceleration with mean of 23 mmHg and a peak of 38 mmHg and mild insufficiency. Dilated truncal root, unchanged. No effusion.    Assessment and Plan: Spike is a 28 " year old male with truncus arteriosus s/p repair with an RV-PA conduit, subsequent conduit replacement with a 21mm Biocor valve, now status-post right pulmonary artery stent and transcatheter 20mm Zarina valve in the conduit, with good results. I discussed findings today with Spike. He will follow-up in 1 year with an echocardiogram and ECG; I will likely obtain a CT-angiogram to re-evaluate his conduit and ascending aorta at that time. Because his cardiac condition is quite stable, after discussion we agreed to have him continue to follow-up with me instead of transitioning now to an adult congenital cardiologist. I talked with Spike about the limits of my experience as a pediatrician, and that I would be very honest and transparent with him if a situation arises where I am not adequately equipped to manage his cardiac care. He has no activity restrictions. YES antibiotic prophylaxis required for invasive procedures: cardiac valve repair with a prosthetic valve or prosthetic material.    Thank you for the opportunity to follow Spike with you. Please don't hesitate to contact me with questions or concerns.    Matthew Galvez MD  Pediatric Cardiology  [facility] [place] [address]  Phone [phone]  Fax [phone]    I spent a total of 30 minutes reviewing records and results, obtaining direct clinical information, counseling, and coordinating care for Spike Silva during today's office visit.     Review of the result(s) of each unique test - ECG, echocardiogram                Please do not hesitate to contact me if you have any questions/concerns.     Sincerely,       Matthew Galvez MD

## 2023-10-11 NOTE — PROGRESS NOTES
Pediatric Cardiology Visit    Patient:  Spike Silva MRN:  [id number]   YOB: 1995 Age:  28 year old   Date of Visit:  10/11/2023 PCP:  Maria Del Carmen Vasquez APRN CNP     Dear Dr. Vasquez:    I had the pleasure of seeing Spike Silva at the [facility] in [place] on 10/11/2023 in ongoing consultation for truncus arteriosus. He presented today by himself. Today's history obtained from Spike. As you know, he is a 28 year old male with truncus arteriosus, status-post initial pulmonary artery banding, subsequent complete repair with RV-PA conduit and patch ventricular septal defect repair; subsequently status-post surgical pulmonary valve replacement with a 21mm porcine Biocor valve in 2008 with Dr. Sexton. In 2017 I was concerned for progressive RV-PA conduit stenosis, and arranged for a CT angiogram to better evaluate this, which showed significant obstruction. He underwent cardiac catheterization with my colleague Dr. Matt; the Biocor pulmonary valve was dilated with an 18mm MARC balloon, followed by balloon angioplasty and placement of a [id number] LD Max stent on 16mm BiB in the proximal RPA. He then had balloon dilation of the distal conduit, pre-stenting, and implantation of a Zarina TPV 20. I last saw him in 1/2022, and in the interval since then he has been healthy. He is working as a . Getting  next week!  No complaints of/perceived chest pain, dyspnea, palpitation, syncope/pre-syncope, easy fatigability. Easily keeps up with peers.    Past medical history:   Past Medical History:   Diagnosis Date    Migraine headache     PONV (postoperative nausea and vomiting)     Truncus arteriosus 7/25/2012    RV hypertension, recurrent conduit stenosis    As above. I reviewed Spike Silva's medical records.    He has a current medication list which includes the following prescription(s): albuterol,  "aspirin, cyproheptadine, and topiramate. He is allergic to amoxicillin.    Family and Social History:  As above.    The Review of Systems is negative other than noted in the HPI.    Physical Examination:  /80 (BP Location: Right arm, Patient Position: Sitting, Cuff Size: Adult Regular)   Pulse 82   Ht 1.8 m (5' 10.87\")   Wt 81.8 kg (180 lb 5.4 oz)   BMI 25.25 kg/m    GENERAL: Pleasant and conversant, non-distressed  SKIN: Clear, no rash or abnormal pigmentation  HEAD: NC/AT, nondysmorphic  NECK: Supple without lymphadenopathy or thyromegaly  LUNGS: CTAB, normal symmetric air entry, normal WOB, no rales/rhonchi/wheezes  HEART: Quiet precordium, RRR, normal S1, split S2, 2/6 JEFFREY along the LUSB, 1/4 diastolic rumble best at the upper sternal borders, no r/g  ABDOMEN: Soft, NT/ND, normoactive BS, no HSM  EXTREMITIES: W/WP, no c/c/e, pulses 2+ throughout without radio-femoral delay  GENITOURINARY: deferred    I reviewed and interpreted Spike's ECG from today, which showednormal sinus rhythm, normal axes and RBBB (QRSduration 146 msec), no preexcitation, normal ST-T waves, and normal voltages.     I reviewed his echo from today, which showed normal RV size and mild RVH with normal systolic function. Mild gradient across the Zarina valve, peak 37mmHg, no insufficiency. No RPA acceleration across the RPA stent. RVSP  57 mmHg+RAP. Normal LV size and function. Mild truncal valve flow acceleration with mean of 23 mmHg and a peak of 38 mmHg and mild insufficiency. Dilated truncal root, unchanged. No effusion.    Assessment and Plan: Spike is a 28 year old male with truncus arteriosus s/p repair with an RV-PA conduit, subsequent conduit replacement with a 21mm Biocor valve, now status-post right pulmonary artery stent and transcatheter 20mm Zarina valve in the conduit, with good results. I discussed findings today with Spike. He will follow-up in 1 year with an echocardiogram and ECG; I will likely obtain a " CT-angiogram to re-evaluate his conduit and ascending aorta at that time. Because his cardiac condition is quite stable, after discussion we agreed to have him continue to follow-up with me instead of transitioning now to an adult congenital cardiologist. I talked with Spike about the limits of my experience as a pediatrician, and that I would be very honest and transparent with him if a situation arises where I am not adequately equipped to manage his cardiac care. He has no activity restrictions. YES antibiotic prophylaxis required for invasive procedures: cardiac valve repair with a prosthetic valve or prosthetic material.    Thank you for the opportunity to follow Spike with you. Please don't hesitate to contact me with questions or concerns.    Matthew Galvez MD  Pediatric Cardiology  [facility] [place] [address]  Phone [phone]  Fax [phone]    I spent a total of 30 minutes reviewing records and results, obtaining direct clinical information, counseling, and coordinating care for Spike Silva during today's office visit.     Review of the result(s) of each unique test - ECG, echocardiogram

## 2023-10-11 NOTE — PATIENT INSTRUCTIONS
[facility]   Pediatric Specialty Clinic [place]      Pediatric Call Center Scheduling and Nurse Questions:  [phone]    After hours urgent matters that cannot wait until the next business day:  [phone].  Ask for the on-call pediatric doctor for the specialty you are calling for be paged.      Prescription Renewals:  Please call your pharmacy first.  Your pharmacy must fax requests to [phone].  Please allow 2-3 days for prescriptions to be authorized.    If your physician has ordered a CT or MRI, you may schedule this test by calling [facility] in [place] at [phone].        **If your child is having a sedated procedure, they will need a history and physical done at their Primary Care Provider within 30 days of the procedure.  If your child was seen by the ordering provider in our office within 30 days of the procedure, their visit summary will work for the H&P unless they inform you otherwise.  If you have any questions, please call the RN Care Coordinator.**

## 2023-10-30 LAB
ATRIAL RATE - MUSE: 71 BPM
DIASTOLIC BLOOD PRESSURE - MUSE: NORMAL MMHG
INTERPRETATION ECG - MUSE: NORMAL
P AXIS - MUSE: 40 DEGREES
PR INTERVAL - MUSE: 192 MS
QRS DURATION - MUSE: 146 MS
QT - MUSE: 444 MS
QTC - MUSE: 482 MS
R AXIS - MUSE: 80 DEGREES
SYSTOLIC BLOOD PRESSURE - MUSE: NORMAL MMHG
T AXIS - MUSE: 80 DEGREES
VENTRICULAR RATE- MUSE: 71 BPM

## 2024-01-27 ENCOUNTER — HEALTH MAINTENANCE LETTER (OUTPATIENT)
Age: 29
End: 2024-01-27

## 2024-10-08 DIAGNOSIS — Z95.2 HISTORY OF ARTIFICIAL HEART VALVE: ICD-10-CM

## 2024-10-08 DIAGNOSIS — Q20.0 TRUNCUS ARTERIOSUS: Primary | ICD-10-CM

## 2024-10-09 ENCOUNTER — OFFICE VISIT (OUTPATIENT)
Dept: PEDIATRIC CARDIOLOGY | Facility: CLINIC | Age: 29
End: 2024-10-09
Payer: COMMERCIAL

## 2024-10-09 ENCOUNTER — ANCILLARY PROCEDURE (OUTPATIENT)
Dept: CARDIOLOGY | Facility: CLINIC | Age: 29
End: 2024-10-09
Payer: COMMERCIAL

## 2024-10-09 VITALS
HEART RATE: 73 BPM | DIASTOLIC BLOOD PRESSURE: 83 MMHG | HEIGHT: 71 IN | BODY MASS INDEX: 25.19 KG/M2 | WEIGHT: 179.9 LBS | SYSTOLIC BLOOD PRESSURE: 123 MMHG

## 2024-10-09 DIAGNOSIS — Z95.2 HISTORY OF ARTIFICIAL HEART VALVE: ICD-10-CM

## 2024-10-09 DIAGNOSIS — Q20.0 TRUNCUS ARTERIOSUS: ICD-10-CM

## 2024-10-09 DIAGNOSIS — Q20.0 TRUNCUS ARTERIOSUS: Primary | ICD-10-CM

## 2024-10-09 LAB
ATRIAL RATE - MUSE: 57 BPM
DIASTOLIC BLOOD PRESSURE - MUSE: NORMAL MMHG
INTERPRETATION ECG - MUSE: NORMAL
P AXIS - MUSE: 52 DEGREES
PR INTERVAL - MUSE: 192 MS
QRS DURATION - MUSE: 148 MS
QT - MUSE: 466 MS
QTC - MUSE: 453 MS
R AXIS - MUSE: 69 DEGREES
SYSTOLIC BLOOD PRESSURE - MUSE: NORMAL MMHG
T AXIS - MUSE: 80 DEGREES
VENTRICULAR RATE- MUSE: 57 BPM

## 2024-10-09 PROCEDURE — 99214 OFFICE O/P EST MOD 30 MIN: CPT | Mod: 25 | Performed by: PEDIATRICS

## 2024-10-09 PROCEDURE — 93303 ECHO TRANSTHORACIC: CPT | Performed by: STUDENT IN AN ORGANIZED HEALTH CARE EDUCATION/TRAINING PROGRAM

## 2024-10-09 PROCEDURE — 93320 DOPPLER ECHO COMPLETE: CPT | Performed by: STUDENT IN AN ORGANIZED HEALTH CARE EDUCATION/TRAINING PROGRAM

## 2024-10-09 PROCEDURE — 93325 DOPPLER ECHO COLOR FLOW MAPG: CPT | Performed by: STUDENT IN AN ORGANIZED HEALTH CARE EDUCATION/TRAINING PROGRAM

## 2024-10-09 ASSESSMENT — PAIN SCALES - GENERAL: PAINLEVEL: NO PAIN (0)

## 2024-10-09 NOTE — PATIENT INSTRUCTIONS
[facility]   Pediatric Specialty Clinic [place]      Pediatric Call Center Scheduling and Nurse Questions:  [phone]    After hours urgent matters that cannot wait until the next business day:  [phone].  Ask for the on-call pediatric doctor for the specialty you are calling for be paged.      Prescription Renewals:  Please call your pharmacy first.  Your pharmacy must fax requests to [phone].  Please allow 2-3 days for prescriptions to be authorized.    If your physician has ordered a CT or MRI, you may schedule this test by calling [facility] in [place] at [phone].        **If your child is having a sedated procedure, they will need a history and physical done at their Primary Care Provider within 30 days of the procedure.  If your child was seen by the ordering provider in our office within 30 days of the procedure, their visit summary will work for the H&P unless they inform you otherwise.  If you have any questions, please call the RN Care Coordinator.**

## 2024-10-09 NOTE — PROGRESS NOTES
"Stable    Just ahd a baby boy \"Juan!\"    Order CT-angio to eval AAO caliber, proximal coronaries, conduit calcification    Follow-up 1 year with echo  " "following prescription(s): albuterol, aspirin, cyproheptadine, and topiramate. He is allergic to amoxicillin.    Family and Social History:  unchanged    The Review of Systems is negative other than noted in the HPI.    Physical Examination:  /83 (BP Location: Right arm, Patient Position: Sitting, Cuff Size: Adult Regular)   Pulse 73   Ht 1.795 m (5' 10.67\")   Wt 81.6 kg (179 lb 14.3 oz)   BMI 25.33 kg/m    GENERAL: Pleasant and conversant, non-distressed  SKIN: Clear, no rash or abnormal pigmentation  HEAD: NC/AT, nondysmorphic  NECK: Supple without lymphadenopathy or thyromegaly  LUNGS: CTAB, normal symmetric air entry, normal WOB, no rales/rhonchi/wheezes  HEART: Quiet precordium, RRR, normal S1/S2, no murmurs, no r/g  ABDOMEN: Soft, NT/ND, normoactive BS, no HSM  EXTREMITIES: W/WP, no c/c/e, pulses 2+ throughout without radio-femoral delay  GENITOURINARY: deferred      I reviewed his echo from today, which showed normal RV size and mild RVH with normal systolic function. Mild gradient across the Zarina valve, peak 40mmHg, no insufficiency. No RPA acceleration across the RPA stent. RVSP  55 mmHg+RAP. Normal LV size and function. Mild truncal valve flow acceleration with mean of 26 mmHg and a peak of 52 mmHg and mild insufficiency. Dilated truncal root, unchanged. No effusion.     Assessment and Plan: Spike is a 29 year old male with truncus arteriosus s/p repair with an RV-PA conduit, subsequent conduit replacement with a 21mm Biocor valve, now status-post right pulmonary artery stent and transcatheter 20mm Zarina valve in the conduit, with good results. I discussed findings today with Spike.  I will order CT-angio to eval AAO caliber, proximal coronaries, conduit calcification. He will follow-up in 1 year with an echocardiogram; we again discussed eventual transition to adult congenital cardiology, though given his longstanding stability, I do not feel this is urgent. He has no activity restrictions. " YES antibiotic prophylaxis required for invasive procedures: cardiac valve repair with a prosthetic valve or prosthetic material.    Thank you for the opportunity to follow Spike with you. Please don't hesitate to contact me with questions or concerns.    Matthew Galvez MD  Pediatric Cardiology  [facility] [address] [address]  Phone [phone]  Fax [phone]    I spent a total of 25 minutes reviewing records and results, obtaining direct clinical information, counseling, and coordinating care for Spike Silva during today's office visit.     Review of the result(s) of each unique test - echocardiogram

## 2024-10-09 NOTE — NURSING NOTE
"[facility] [[id number]]  Chief Complaint   Patient presents with    Follow Up     Truncus arteriosus     Initial /83 (BP Location: Right arm, Patient Position: Sitting, Cuff Size: Adult Regular)   Pulse 73   Ht 1.795 m (5' 10.67\")   Wt 81.6 kg (179 lb 14.3 oz)   BMI 25.33 kg/m   Estimated body mass index is 25.33 kg/m  as calculated from the following:    Height as of this encounter: 1.795 m (5' 10.67\").    Weight as of this encounter: 81.6 kg (179 lb 14.3 oz).  Medication Reconciliation: complete    Does the patient need any medication refills today? No    Does the patient/parent need MyChart or Proxy acces today? No    Has the patient received a flu shot this season? No    Do they want one today? No            "

## 2024-10-09 NOTE — Clinical Note
10/9/2024      RE: Spike Silva  [address] [facility] [address]     Dear Colleague,    Thank you for the opportunity to participate in the care of your patient, Spike Silva, at the [facility] CL[facility]. Please see a copy of my visit note below.    No notes on file    Please do not hesitate to contact me if you have any questions/concerns.     Sincerely,       Matthew Galvez MD

## 2024-11-01 ENCOUNTER — HOSPITAL ENCOUNTER (OUTPATIENT)
Dept: CT IMAGING | Facility: CLINIC | Age: 29
Discharge: HOME | End: 2024-11-01
Attending: PEDIATRICS | Admitting: PEDIATRICS
Payer: COMMERCIAL

## 2024-11-01 DIAGNOSIS — Z95.2 HISTORY OF ARTIFICIAL HEART VALVE: ICD-10-CM

## 2024-11-01 DIAGNOSIS — Q20.0 TRUNCUS ARTERIOSUS: ICD-10-CM

## 2024-11-01 PROCEDURE — 999N000248 HC STATISTIC IV INSERT WITH US BY RN

## 2024-11-01 PROCEDURE — 250N000011 HC RX IP 250 OP 636: Performed by: STUDENT IN AN ORGANIZED HEALTH CARE EDUCATION/TRAINING PROGRAM

## 2024-11-01 PROCEDURE — 75572 CT HRT W/3D IMAGE: CPT

## 2024-11-01 PROCEDURE — 75572 CT HRT W/3D IMAGE: CPT | Mod: 26 | Performed by: STUDENT IN AN ORGANIZED HEALTH CARE EDUCATION/TRAINING PROGRAM

## 2024-11-01 RX ORDER — IOPAMIDOL 755 MG/ML
122 INJECTION, SOLUTION INTRAVASCULAR ONCE
Status: COMPLETED | OUTPATIENT
Start: 2024-11-01 | End: 2024-11-01

## 2024-11-01 RX ADMIN — IOPAMIDOL 122 ML: 755 INJECTION, SOLUTION INTRAVENOUS at 09:46

## 2024-12-15 ENCOUNTER — NURSE TRIAGE (OUTPATIENT)
Dept: NURSING | Facility: CLINIC | Age: 29
End: 2024-12-15
Payer: COMMERCIAL

## 2024-12-15 NOTE — TELEPHONE ENCOUNTER
"Nurse Triage SBAR    Is this a 2nd Level Triage? NO    Situation: Nosebleed    Background: Pt gave verbal consent to speak with Mariangel. Per Mariangel pt has been \"holding pressure with a clamp and fingers for over a half hour and not stopping\". Pt takes a daily baby aspirin but no blood thinners per Mariangel. Mariangel denies pt has weakness or dizziness. Per Mariangel pt has a history of nosebleeds and has needed cauterization in the past.     Assessment:  Rule out coagulopathy    Protocol Recommended Disposition:   Go to ED Now    Recommendation:  Writer reviewed correct method of stopping nosebleed with Mariangel and advised Mariangel if that is not effective to have another adult drive pt to ED.      Mariangel verbalizes understanding and agrees to plan.     Does the patient meet one of the following criteria for ADS visit consideration? 16+ years old, with an [facility] PCP     TIP  Providers, please consider if this condition is appropriate for management at one of our Acute and Diagnostic Services sites.     If patient is a good candidate, please use dotphrase <dot>triageresponse and select Refer to ADS to document.    Reason for Disposition   [1] Bleeding present > 30 minutes AND [2] using correct method of direct pressure    Additional Information   Negative: Fainted or too weak to stand following large blood loss   Negative: Sounds like a life-threatening emergency to the triager   Negative: Nosebleed followed a nose injury    Protocols used: Nosebleed-A-AH    "

## 2025-02-01 ENCOUNTER — HEALTH MAINTENANCE LETTER (OUTPATIENT)
Age: 30
End: 2025-02-01

## 2025-05-14 NOTE — PATIENT INSTRUCTIONS
PEDS CARDIOLOGY  [facility] [address] [place] [address]  [address] [facility] [id number] [phone]      Cardiology Clinic  [phone]  RN Care Coordinator, Carolina Leija (Bre)  [phone]  Pediatric Call Center/Scheduling  [phone]    After Hours and Emergency Contact Number  [phone]  * Ask for the pediatric cardiologist on call         Prescription Renewals  The pharmacy must fax requests to [phone]  * Please allow 3-4 days for prescriptions to be authorized       
good balance

## (undated) DEVICE — SU SILK 0 CT-1 CR 8X18" C021D

## (undated) DEVICE — WIPE PAMPERS PREMOIST CLEANSING BABY SENSITIVE 17116

## (undated) DEVICE — SU SILK 1 TIE 6X30" A307H

## (undated) DEVICE — TOURNIQUET VASCULAR KIT 5 1/2" TRICOLOR 79003

## (undated) DEVICE — SU PROLENE 3-0 SHDA 36" 8522H

## (undated) DEVICE — PACK ADULT HEART UMMC PV15CG92D

## (undated) DEVICE — SOL NACL 0.9% IRRIG 1000ML BOTTLE 2F7124

## (undated) DEVICE — GOWN REINFORCED XXLG 9071

## (undated) DEVICE — SU PROLENE 4-0 BBDA 24" 8861

## (undated) DEVICE — DRSG TEGADERM 4X4 3/4" 1626W

## (undated) DEVICE — LINEN TOWEL PACK X6 WHITE 5487

## (undated) DEVICE — DRAPE SLUSH/WARMER 66X44" ORS-320

## (undated) DEVICE — SU SILK 0 TIE 6X18" A186H

## (undated) DEVICE — Device

## (undated) DEVICE — GLOVE ANSELL ENCORE MICRO 8 LATEX 5787005

## (undated) DEVICE — LINEN TOWEL PACK X30 5481

## (undated) DEVICE — PREP CHLORAPREP 26ML TINTED ORANGE  260815

## (undated) DEVICE — ESU ELEC BLADE 2.75" COATED/INSULATED E1455

## (undated) RX ORDER — HEPARIN SODIUM 1000 [USP'U]/ML
INJECTION, SOLUTION INTRAVENOUS; SUBCUTANEOUS
Status: DISPENSED
Start: 2017-09-15

## (undated) RX ORDER — FENTANYL CITRATE 50 UG/ML
INJECTION, SOLUTION INTRAMUSCULAR; INTRAVENOUS
Status: DISPENSED
Start: 2017-09-15

## (undated) RX ORDER — CEFAZOLIN SODIUM 1 G/3ML
INJECTION, POWDER, FOR SOLUTION INTRAMUSCULAR; INTRAVENOUS
Status: DISPENSED
Start: 2017-09-15

## (undated) RX ORDER — ROCURONIUM BROMIDE 50 MG/5 ML
SYRINGE (ML) INTRAVENOUS
Status: DISPENSED
Start: 2017-09-15

## (undated) RX ORDER — LIDOCAINE HYDROCHLORIDE 10 MG/ML
INJECTION, SOLUTION EPIDURAL; INFILTRATION; INTRACAUDAL; PERINEURAL
Status: DISPENSED
Start: 2017-09-15

## (undated) RX ORDER — ONDANSETRON 2 MG/ML
INJECTION INTRAMUSCULAR; INTRAVENOUS
Status: DISPENSED
Start: 2017-09-15

## (undated) RX ORDER — METOPROLOL TARTRATE 25 MG/1
TABLET, FILM COATED ORAL
Status: DISPENSED
Start: 2017-07-18

## (undated) RX ORDER — LIDOCAINE HYDROCHLORIDE 20 MG/ML
INJECTION, SOLUTION EPIDURAL; INFILTRATION; INTRACAUDAL; PERINEURAL
Status: DISPENSED
Start: 2017-09-15

## (undated) RX ORDER — PROPOFOL 10 MG/ML
INJECTION, EMULSION INTRAVENOUS
Status: DISPENSED
Start: 2017-09-15

## (undated) RX ORDER — DEXAMETHASONE SODIUM PHOSPHATE 4 MG/ML
INJECTION, SOLUTION INTRA-ARTICULAR; INTRALESIONAL; INTRAMUSCULAR; INTRAVENOUS; SOFT TISSUE
Status: DISPENSED
Start: 2017-09-15